# Patient Record
Sex: FEMALE | Race: WHITE | NOT HISPANIC OR LATINO
[De-identification: names, ages, dates, MRNs, and addresses within clinical notes are randomized per-mention and may not be internally consistent; named-entity substitution may affect disease eponyms.]

---

## 2018-11-13 ENCOUNTER — RECORD ABSTRACTING (OUTPATIENT)
Age: 53
End: 2018-11-13

## 2018-11-13 DIAGNOSIS — Z82.69 FAMILY HISTORY OF OTHER DISEASES OF THE MUSCULOSKELETAL SYSTEM AND CONNECTIVE TISSUE: ICD-10-CM

## 2018-11-13 DIAGNOSIS — Z86.19 PERSONAL HISTORY OF OTHER INFECTIOUS AND PARASITIC DISEASES: ICD-10-CM

## 2018-11-13 DIAGNOSIS — Z83.3 FAMILY HISTORY OF DIABETES MELLITUS: ICD-10-CM

## 2018-11-13 DIAGNOSIS — Z82.49 FAMILY HISTORY OF ISCHEMIC HEART DISEASE AND OTHER DISEASES OF THE CIRCULATORY SYSTEM: ICD-10-CM

## 2018-11-13 DIAGNOSIS — Z86.018 PERSONAL HISTORY OF OTHER BENIGN NEOPLASM: ICD-10-CM

## 2018-11-13 DIAGNOSIS — Z80.1 FAMILY HISTORY OF MALIGNANT NEOPLASM OF TRACHEA, BRONCHUS AND LUNG: ICD-10-CM

## 2018-11-13 DIAGNOSIS — Z85.850 PERSONAL HISTORY OF MALIGNANT NEOPLASM OF THYROID: ICD-10-CM

## 2018-11-13 DIAGNOSIS — Z87.891 PERSONAL HISTORY OF NICOTINE DEPENDENCE: ICD-10-CM

## 2018-11-13 RX ORDER — FLUTICASONE PROPIONATE 50 UG/1
50 SPRAY, METERED NASAL DAILY
Refills: 0 | Status: ACTIVE | COMMUNITY

## 2018-11-13 RX ORDER — DESLORATADINE 5 MG/1
5 TABLET, FILM COATED ORAL DAILY
Refills: 0 | Status: ACTIVE | COMMUNITY

## 2018-12-04 ENCOUNTER — LABORATORY RESULT (OUTPATIENT)
Age: 53
End: 2018-12-04

## 2018-12-11 ENCOUNTER — APPOINTMENT (OUTPATIENT)
Dept: ENDOCRINOLOGY | Facility: CLINIC | Age: 53
End: 2018-12-11
Payer: COMMERCIAL

## 2018-12-11 VITALS — SYSTOLIC BLOOD PRESSURE: 112 MMHG | HEART RATE: 80 BPM | DIASTOLIC BLOOD PRESSURE: 78 MMHG | HEIGHT: 61 IN

## 2018-12-11 PROCEDURE — 99214 OFFICE O/P EST MOD 30 MIN: CPT

## 2018-12-11 RX ORDER — LEVOTHYROXINE SODIUM 112 UG/1
112 TABLET ORAL
Refills: 0 | Status: DISCONTINUED | COMMUNITY
End: 2018-12-11

## 2018-12-11 RX ORDER — SERTRALINE HYDROCHLORIDE 25 MG/1
25 TABLET, FILM COATED ORAL DAILY
Refills: 0 | Status: DISCONTINUED | COMMUNITY
End: 2018-12-11

## 2018-12-13 ENCOUNTER — TRANSCRIPTION ENCOUNTER (OUTPATIENT)
Age: 53
End: 2018-12-13

## 2019-02-05 ENCOUNTER — TRANSCRIPTION ENCOUNTER (OUTPATIENT)
Age: 54
End: 2019-02-05

## 2019-03-11 ENCOUNTER — APPOINTMENT (OUTPATIENT)
Dept: ENDOCRINOLOGY | Facility: CLINIC | Age: 54
End: 2019-03-11
Payer: COMMERCIAL

## 2019-03-11 VITALS
SYSTOLIC BLOOD PRESSURE: 110 MMHG | HEIGHT: 61 IN | HEART RATE: 88 BPM | WEIGHT: 150 LBS | BODY MASS INDEX: 28.32 KG/M2 | DIASTOLIC BLOOD PRESSURE: 70 MMHG

## 2019-03-11 LAB
THYROGLOB AB SERPL-ACNC: <20 IU/ML
THYROPEROXIDASE AB SERPL IA-ACNC: <10 IU/ML
TSH SERPL-ACNC: 0.87 UIU/ML

## 2019-03-11 PROCEDURE — 99214 OFFICE O/P EST MOD 30 MIN: CPT

## 2019-03-11 NOTE — PHYSICAL EXAM
[Alert] : alert [No Acute Distress] : no acute distress [Well Nourished] : well nourished [Well Developed] : well developed [Normal Sclera/Conjunctiva] : normal sclera/conjunctiva [EOMI] : extra ocular movement intact [No Proptosis] : no proptosis [Normal Oropharynx] : the oropharynx was normal [Thyroid Not Enlarged] : the thyroid was not enlarged [No Thyroid Nodules] : there were no palpable thyroid nodules [No Respiratory Distress] : no respiratory distress [No Accessory Muscle Use] : no accessory muscle use [Clear to Auscultation] : lungs were clear to auscultation bilaterally [Normal Rate] : heart rate was normal  [Normal S1, S2] : normal S1 and S2 [Regular Rhythm] : with a regular rhythm [Pedal Pulses Normal] : the pedal pulses are present [No Edema] : there was no peripheral edema [Normal Bowel Sounds] : normal bowel sounds [Not Tender] : non-tender [Soft] : abdomen soft [Not Distended] : not distended [Post Cervical Nodes] : posterior cervical nodes [Anterior Cervical Nodes] : anterior cervical nodes [Axillary Nodes] : axillary nodes [Normal] : normal and non tender [No Spinal Tenderness] : no spinal tenderness [Spine Straight] : spine straight [No Stigmata of Cushings Syndrome] : no stigmata of cushings syndrome [Normal Gait] : normal gait [Normal Strength/Tone] : muscle strength and tone were normal [No Rash] : no rash [Acanthosis Nigricans] : no acanthosis nigricans [Normal Reflexes] : deep tendon reflexes were 2+ and symmetric [No Tremors] : no tremors [Oriented x3] : oriented to person, place, and time [de-identified] : thyroid absent surgically

## 2019-03-11 NOTE — REVIEW OF SYSTEMS
[Recent Weight Gain (___ Lbs)] : recent [unfilled] ~Ulb weight gain [Anxiety] : anxiety [Negative] : Heme/Lymph [de-identified] : + nevi [de-identified] : + hot flashes

## 2019-03-11 NOTE — HISTORY OF PRESENT ILLNESS
[FreeTextEntry1] :  52 yo WW coming for f/u papillary thyroid cancer\par 2/18 As compared to the patient's baseline study dated 10/13/2015, there has been no statistically \par significant change in bone density at the lumbar spine or the left hip with a statistically \par significant interval decrease in bone density noted at the left forearm. \par \par        6/1/18 neck US normal done with Dr Navarro at [place]\par        had total thyroidectomy 6/2009 done by Dr Laine Sethi [facility], retired now\par        labs reviewed\par        her deramthologist felt the thyroid nodule in 2008, an Endo in [place] did FNA without US came back negative, then repeated US guided FNA at [facility] showed papillary thyroid cancer one year later\par        did not need BATRES\par        her sister had also papillary thyroid cancer \par        last labwork reviewed \par        TSH 0.09 mIU/L FT4 1.3 (0.8-1.8) same dose Synthroid WILLIE 112mcg qd decreased from 125mcg qd 9/18\par \par        Hg 10.5 (11.7 and above) TG 0.3, TG ab <1, Vit D 29\par        labs 2010 TSH 0.20, FT4 0.92\par        thyroid uptake and scan 2008 showed 1.4cm left aspect of the isthmus cold area, possible thyroid malignancy\par        3/2008 1.4cm slightly lobulatedhetherogenously hypoechoic solid noduleon the left of the isthmus\par        neck US 5/27/15 9.7mm hyperechoic structure to the right is not thought to be thyroid parenchima, althout that possibility is not entirely excluded\par        7/29/2015 74.8mCi I131\par        postradiation WBS done 8/5/2015 2 small focal areas radioaiodine concentration in the midline anterior neck suspicious for small metastases\par         thyroidectomy 7/6/2009 papillary thyroid carcinoma, classical type, well differentiatled, with psammoma bodies, left lobe, greatest diameter 1.3cm, completelly surrounded, focal capsular invasion, focal blood vessel invasion, extrathyroid extension not identified, surgical margins + tumor , microscopic foci are present in the left lobe and the isthmus, one benign lymph node, fragment of prathyroid \par        labs reviewed \par        10/2015 DEXA scan normal spine and hip, mildy osteopenia forearm\par        iPTH elevated at 90, normal Ca normal Vit D, repeated iPTH 114 on 5/16\par        denies personal h/o kidney stones\par        denies h/o fractures\par        thyrogen stimulated WBS 6/6/2016 normal\par        thyrogen stim thyroglobulin level 6/6/2016 at [place] Tg <0.2 ng/ML, atg <20\par        thyrogen stimulated whole body scan negative 6/14/17\par        thyroglobulin level 6/19/17 0.7 negative ab.

## 2019-06-05 ENCOUNTER — LABORATORY RESULT (OUTPATIENT)
Age: 54
End: 2019-06-05

## 2019-06-11 ENCOUNTER — APPOINTMENT (OUTPATIENT)
Dept: ENDOCRINOLOGY | Facility: CLINIC | Age: 54
End: 2019-06-11
Payer: COMMERCIAL

## 2019-06-11 VITALS
DIASTOLIC BLOOD PRESSURE: 78 MMHG | WEIGHT: 147 LBS | HEART RATE: 84 BPM | BODY MASS INDEX: 27.75 KG/M2 | SYSTOLIC BLOOD PRESSURE: 126 MMHG | HEIGHT: 61 IN

## 2019-06-11 PROCEDURE — 99215 OFFICE O/P EST HI 40 MIN: CPT

## 2019-06-11 RX ORDER — SERTRALINE HYDROCHLORIDE 50 MG/1
50 TABLET, FILM COATED ORAL DAILY
Refills: 0 | Status: ACTIVE | COMMUNITY

## 2019-06-11 NOTE — PHYSICAL EXAM
[Alert] : alert [No Acute Distress] : no acute distress [Well Developed] : well developed [Well Nourished] : well nourished [EOMI] : extra ocular movement intact [Normal Sclera/Conjunctiva] : normal sclera/conjunctiva [No Proptosis] : no proptosis [Normal Oropharynx] : the oropharynx was normal [Thyroid Not Enlarged] : the thyroid was not enlarged [No Respiratory Distress] : no respiratory distress [No Thyroid Nodules] : there were no palpable thyroid nodules [No Accessory Muscle Use] : no accessory muscle use [Clear to Auscultation] : lungs were clear to auscultation bilaterally [Normal S1, S2] : normal S1 and S2 [Normal Rate] : heart rate was normal  [Pedal Pulses Normal] : the pedal pulses are present [Regular Rhythm] : with a regular rhythm [No Edema] : there was no peripheral edema [Normal Bowel Sounds] : normal bowel sounds [Soft] : abdomen soft [Not Tender] : non-tender [Post Cervical Nodes] : posterior cervical nodes [Not Distended] : not distended [Anterior Cervical Nodes] : anterior cervical nodes [Axillary Nodes] : axillary nodes [Normal] : normal and non tender [No Stigmata of Cushings Syndrome] : no stigmata of cushings syndrome [No Spinal Tenderness] : no spinal tenderness [Spine Straight] : spine straight [Normal Gait] : normal gait [Normal Strength/Tone] : muscle strength and tone were normal [No Rash] : no rash [Acanthosis Nigricans] : no acanthosis nigricans [Normal Reflexes] : deep tendon reflexes were 2+ and symmetric [No Tremors] : no tremors [Oriented x3] : oriented to person, place, and time

## 2019-06-11 NOTE — REVIEW OF SYSTEMS
[Anxiety] : anxiety [Recent Weight Gain (___ Lbs)] : recent [unfilled] ~Ulb weight gain [Negative] : Endocrine [de-identified] : + hot flashes [de-identified] : + nevi

## 2019-06-11 NOTE — HISTORY OF PRESENT ILLNESS
[FreeTextEntry1] :  52 yo WW coming for f/u papillary thyroid cancer\par 2/18 As compared to the patient's baseline study dated 10/13/2015, there has been no statistically \par significant change in bone density at the lumbar spine or the left hip with a statistically \par significant interval decrease in bone density noted at the left forearm. \par \par        6/1/18 neck US normal done with Dr Navarro at [place]\par        had total thyroidectomy 6/2009 done by Dr Laine Sethi [facility], retired now\par        labs reviewed\par        her deramthologist felt the thyroid nodule in 2008, an Endo in [place] did FNA without US came back negative, then repeated US guided FNA at [facility] showed papillary thyroid cancer one year later\par        did not need BATRES\par        her sister had also papillary thyroid cancer \par        last labwork reviewed \par        TSH 0.09 mIU/L FT4 1.3 (0.8-1.8) same dose Synthroid WILLIE 112mcg qd decreased from 125mcg qd 9/18\par \par        Hg 10.5 (11.7 and above) TG 0.3, TG ab <1, Vit D 29\par        labs 2010 TSH 0.20, FT4 0.92\par        thyroid uptake and scan 2008 showed 1.4cm left aspect of the isthmus cold area, possible thyroid malignancy\par        3/2008 1.4cm slightly lobulatedhetherogenously hypoechoic solid noduleon the left of the isthmus\par        neck US 5/27/15 9.7mm hyperechoic structure to the right is not thought to be thyroid parenchima, althout that possibility is not entirely excluded\par        7/29/2015 74.8mCi I131\par        postradiation WBS done 8/5/2015 2 small focal areas radioaiodine concentration in the midline anterior neck suspicious for small metastases\par         thyroidectomy 7/6/2009 papillary thyroid carcinoma, classical type, well differentiatled, with psammoma bodies, left lobe, greatest diameter 1.3cm, completelly surrounded, focal capsular invasion, focal blood vessel invasion, extrathyroid extension not identified, surgical margins + tumor , microscopic foci are present in the left lobe and the isthmus, one benign lymph node, fragment of prathyroid \par        labs reviewed \par        10/2015 DEXA scan normal spine and hip, mildy osteopenia forearm\par        iPTH elevated at 90, normal Ca normal Vit D, repeated iPTH 114 on 5/16\par        denies personal h/o kidney stones\par        denies h/o fractures\par        thyrogen stimulated WBS 6/6/2016 normal\par        thyrogen stim thyroglobulin level 6/6/2016 at [place] Tg <0.2 ng/ML, atg <20\par        thyrogen stimulated whole body scan negative 6/14/17\par        thyroglobulin level 6/19/17 0.7 negative ab.

## 2019-06-20 ENCOUNTER — RX RENEWAL (OUTPATIENT)
Age: 54
End: 2019-06-20

## 2019-09-09 ENCOUNTER — LABORATORY RESULT (OUTPATIENT)
Age: 54
End: 2019-09-09

## 2019-09-16 ENCOUNTER — APPOINTMENT (OUTPATIENT)
Dept: ENDOCRINOLOGY | Facility: CLINIC | Age: 54
End: 2019-09-16
Payer: COMMERCIAL

## 2019-09-16 VITALS
BODY MASS INDEX: 27.94 KG/M2 | HEIGHT: 61 IN | SYSTOLIC BLOOD PRESSURE: 120 MMHG | HEART RATE: 106 BPM | OXYGEN SATURATION: 96 % | DIASTOLIC BLOOD PRESSURE: 68 MMHG | RESPIRATION RATE: 16 BRPM | WEIGHT: 148 LBS

## 2019-09-16 PROCEDURE — 99214 OFFICE O/P EST MOD 30 MIN: CPT

## 2019-09-16 NOTE — HISTORY OF PRESENT ILLNESS
[FreeTextEntry1] :  52 yo WW coming for f/u papillary thyroid cancer\par 2/18 As compared to the patient's baseline study dated 10/13/2015, there has been no statistically \par significant change in bone density at the lumbar spine or the left hip with a statistically \par significant interval decrease in bone density noted at the left forearm. \par \par        6/1/18 neck US normal done with Dr Navarro at [place]\par        had total thyroidectomy 6/2009 done by Dr Laine Sethi [facility], retired now\par        labs reviewed\par        her deramthologist felt the thyroid nodule in 2008, an Endo in [place] did FNA without US came back negative, then repeated US guided FNA at [facility] showed papillary thyroid cancer one year later\par        did not need BATRES\par        her sister had also papillary thyroid cancer \par        last labwork reviewed \par        TSH 0.09 mIU/L FT4 1.3 (0.8-1.8) same dose Synthroid WILLIE 112mcg qd decreased from 125mcg qd 9/18\par \par        Hg 10.5 (11.7 and above) TG 0.3, TG ab <1, Vit D 29\par        labs 2010 TSH 0.20, FT4 0.92\par        thyroid uptake and scan 2008 showed 1.4cm left aspect of the isthmus cold area, possible thyroid malignancy\par        3/2008 1.4cm slightly lobulatedhetherogenously hypoechoic solid noduleon the left of the isthmus\par        neck US 5/27/15 9.7mm hyperechoic structure to the right is not thought to be thyroid parenchima, althout that possibility is not entirely excluded\par        7/29/2015 74.8mCi I131\par        postradiation WBS done 8/5/2015 2 small focal areas radioaiodine concentration in the midline anterior neck suspicious for small metastases\par         thyroidectomy 7/6/2009 papillary thyroid carcinoma, classical type, well differentiatled, with psammoma bodies, left lobe, greatest diameter 1.3cm, completelly surrounded, focal capsular invasion, focal blood vessel invasion, extrathyroid extension not identified, surgical margins + tumor , microscopic foci are present in the left lobe and the isthmus, one benign lymph node, fragment of prathyroid \par        labs reviewed \par        10/2015 DEXA scan normal spine and hip, mildly osteopenia forearm\par        iPTH elevated at 90, normal Ca normal Vit D, repeated iPTH 114 on 5/16\par        denies personal h/o kidney stones\par        denies h/o fractures\par        thyrogen stimulated WBS 6/6/2016 normal\par        thyrogen stim thyroglobulin level 6/6/2016 at [place] Tg <0.2 ng/ML, atg <20\par        thyrogen stimulated whole body scan negative 6/14/17\par        thyroglobulin level 6/19/17 0.7 negative ab.

## 2019-12-02 ENCOUNTER — LABORATORY RESULT (OUTPATIENT)
Age: 54
End: 2019-12-02

## 2019-12-09 ENCOUNTER — APPOINTMENT (OUTPATIENT)
Dept: ENDOCRINOLOGY | Facility: CLINIC | Age: 54
End: 2019-12-09
Payer: COMMERCIAL

## 2019-12-09 VITALS
OXYGEN SATURATION: 98 % | WEIGHT: 148 LBS | HEART RATE: 86 BPM | DIASTOLIC BLOOD PRESSURE: 70 MMHG | BODY MASS INDEX: 27.94 KG/M2 | SYSTOLIC BLOOD PRESSURE: 120 MMHG | HEIGHT: 61 IN

## 2019-12-09 PROCEDURE — 99214 OFFICE O/P EST MOD 30 MIN: CPT

## 2019-12-27 NOTE — HISTORY OF PRESENT ILLNESS
[FreeTextEntry1] :  52 yo WW coming for f/u papillary thyroid cancer\par 2/18 As compared to the patient's baseline study dated 10/13/2015, there has been no statistically \par significant change in bone density at the lumbar spine or the left hip with a statistically \par significant interval decrease in bone density noted at the left forearm. \par \par        6/1/18 neck US normal done with Dr Navarro at [place]\par        had total thyroidectomy 6/2009 done by Dr Laine Sethi [facility], retired now\par        labs reviewed\par        her deramthologist felt the thyroid nodule in 2008, an Endo in [place] did FNA without US came back negative, then repeated US guided FNA at [facility] showed papillary thyroid cancer one year later\par        did not need BATRES\par        her sister had also papillary thyroid cancer \par        last labwork reviewed \par        TSH 0.09 mIU/L FT4 1.3 (0.8-1.8) same dose Synthroid WILLIE 112mcg qd decreased from 125mcg qd 9/18\par \par        Hg 10.5 (11.7 and above) TG 0.3, TG ab <1, Vit D 29\par        labs 2010 TSH 0.20, FT4 0.92\par        thyroid uptake and scan 2008 showed 1.4cm left aspect of the isthmus cold area, possible thyroid malignancy\par        3/2008 1.4cm slightly lobulatedhetherogenously hypoechoic solid noduleon the left of the isthmus\par        neck US 5/27/15 9.7mm hyperechoic structure to the right is not thought to be thyroid parenchima, althout that possibility is not entirely excluded\par        7/29/2015 74.8mCi I131\par        postradiation WBS done 8/5/2015 2 small focal areas radioaiodine concentration in the midline anterior neck suspicious for small metastases\par         thyroidectomy 7/6/2009 papillary thyroid carcinoma, classical type, well differentiatled, with psammoma bodies, left lobe, greatest diameter 1.3cm, completelly surrounded, focal capsular invasion, focal blood vessel invasion, extrathyroid extension not identified, surgical margins + tumor , microscopic foci are present in the left lobe and the isthmus, one benign lymph node, fragment of prathyroid \par        labs reviewed \par        10/2015 DEXA scan normal spine and hip, mildly osteopenia forearm\par        iPTH elevated at 90, normal Ca normal Vit D, repeated iPTH 114 on 5/16\par        denies personal h/o kidney stones\par        denies h/o fractures\par        thyrogen stimulated WBS 6/6/2016 normal\par        thyrogen stim thyroglobulin level 6/6/2016 at [place] Tg <0.2 ng/ML, atg <20\par        thyrogen stimulated whole body scan negative 6/14/17\par        thyroglobulin level 6/19/17 0.7 negative ab.

## 2019-12-27 NOTE — REVIEW OF SYSTEMS
[Recent Weight Gain (___ Lbs)] : recent [unfilled] ~Ulb weight gain [Anxiety] : anxiety [Negative] : Heme/Lymph [de-identified] : + hot flashes [de-identified] : + nevi

## 2019-12-27 NOTE — PHYSICAL EXAM
[Alert] : alert [No Acute Distress] : no acute distress [Well Nourished] : well nourished [Normal Sclera/Conjunctiva] : normal sclera/conjunctiva [Well Developed] : well developed [EOMI] : extra ocular movement intact [No Proptosis] : no proptosis [No Thyroid Nodules] : there were no palpable thyroid nodules [Normal Oropharynx] : the oropharynx was normal [Thyroid Not Enlarged] : the thyroid was not enlarged [No Accessory Muscle Use] : no accessory muscle use [No Respiratory Distress] : no respiratory distress [Clear to Auscultation] : lungs were clear to auscultation bilaterally [Normal S1, S2] : normal S1 and S2 [Normal Rate] : heart rate was normal  [Pedal Pulses Normal] : the pedal pulses are present [Regular Rhythm] : with a regular rhythm [Normal Bowel Sounds] : normal bowel sounds [No Edema] : there was no peripheral edema [Not Tender] : non-tender [Not Distended] : not distended [Soft] : abdomen soft [Post Cervical Nodes] : posterior cervical nodes [Axillary Nodes] : axillary nodes [Anterior Cervical Nodes] : anterior cervical nodes [No Spinal Tenderness] : no spinal tenderness [Normal] : normal and non tender [No Stigmata of Cushings Syndrome] : no stigmata of cushings syndrome [Spine Straight] : spine straight [Normal Gait] : normal gait [Normal Strength/Tone] : muscle strength and tone were normal [No Rash] : no rash [No Tremors] : no tremors [Normal Reflexes] : deep tendon reflexes were 2+ and symmetric [Acanthosis Nigricans] : no acanthosis nigricans [Oriented x3] : oriented to person, place, and time

## 2020-03-31 ENCOUNTER — APPOINTMENT (OUTPATIENT)
Dept: ENDOCRINOLOGY | Facility: CLINIC | Age: 55
End: 2020-03-31
Payer: COMMERCIAL

## 2020-03-31 PROCEDURE — 99442: CPT

## 2020-07-21 ENCOUNTER — TRANSCRIPTION ENCOUNTER (OUTPATIENT)
Age: 55
End: 2020-07-21

## 2020-07-21 ENCOUNTER — APPOINTMENT (OUTPATIENT)
Dept: ENDOCRINOLOGY | Facility: CLINIC | Age: 55
End: 2020-07-21
Payer: COMMERCIAL

## 2020-07-21 PROCEDURE — 99214 OFFICE O/P EST MOD 30 MIN: CPT | Mod: 95

## 2020-07-21 NOTE — HISTORY OF PRESENT ILLNESS
[Home] : at home, [unfilled] , at the time of the visit. [Medical Office: ([facility])___] : at the medical office located in  [Verbal consent obtained from patient] : the patient, [unfilled] [FreeTextEntry1] :  55 yo WW coming for f/u papillary thyroid cancer\par 2/18 As compared to the patient's baseline study dated 10/13/2015, there has been no statistically \par significant change in bone density at the lumbar spine or the left hip with a statistically \par significant interval decrease in bone density noted at the left forearm. \par \par        6/1/18 neck US normal done with Dr Navarro at [place]\par        had total thyroidectomy 6/2009 done by Dr Laine Sethi [facility], retired now\par        labs reviewed\par        her deramthologist felt the thyroid nodule in 2008, an Endo in [place] did FNA without US came back negative, then repeated US guided FNA at [facility] showed papillary thyroid cancer one year later\par        did not need BATRES\par        her sister had also papillary thyroid cancer \par        last labwork reviewed \par        TSH 0.09 mIU/L FT4 1.3 (0.8-1.8) same dose Synthroid WILLIE 112mcg qd decreased from 125mcg qd 9/18\par \par        Hg 10.5 (11.7 and above) TG 0.3, TG ab <1, Vit D 29\par        labs 2010 TSH 0.20, FT4 0.92\par        thyroid uptake and scan 2008 showed 1.4cm left aspect of the isthmus cold area, possible thyroid malignancy\par        3/2008 1.4cm slightly lobulatedhetherogenously hypoechoic solid noduleon the left of the isthmus\par        neck US 5/27/15 9.7mm hyperechoic structure to the right is not thought to be thyroid parenchima, althout that possibility is not entirely excluded\par        7/29/2015 74.8mCi I131\par        postradiation WBS done 8/5/2015 2 small focal areas radioaiodine concentration in the midline anterior neck suspicious for small metastases\par         thyroidectomy 7/6/2009 papillary thyroid carcinoma, classical type, well differentiatled, with psammoma bodies, left lobe, greatest diameter 1.3cm, completelly surrounded, focal capsular invasion, focal blood vessel invasion, extrathyroid extension not identified, surgical margins + tumor , microscopic foci are present in the left lobe and the isthmus, one benign lymph node, fragment of prathyroid \par        labs reviewed \par        10/2015 DEXA scan normal spine and hip, mildly osteopenia forearm\par        iPTH elevated at 90, normal Ca normal Vit D, repeated iPTH 114 on 5/16\par        denies personal h/o kidney stones\par        denies h/o fractures\par        thyrogen stimulated WBS 6/6/2016 normal\par        thyrogen stim thyroglobulin level 6/6/2016 at [place] Tg <0.2 ng/ML, atg <20\par        thyrogen stimulated whole body scan negative 6/14/17\par        thyroglobulin level 6/19/17 0.7 negative ab.

## 2020-09-24 ENCOUNTER — RESULT REVIEW (OUTPATIENT)
Age: 55
End: 2020-09-24

## 2020-09-30 LAB
24R-OH-CALCIDIOL SERPL-MCNC: 77.6 PG/ML
25(OH)D3 SERPL-MCNC: 59.3 NG/ML
ALBUMIN SERPL ELPH-MCNC: 4.5 G/DL
ALP BLD-CCNC: 92 U/L
ALT SERPL-CCNC: 23 U/L
ANION GAP SERPL CALC-SCNC: 14 MMOL/L
AST SERPL-CCNC: 25 U/L
BILIRUB SERPL-MCNC: 0.2 MG/DL
BUN SERPL-MCNC: 19 MG/DL
CALCIUM SERPL-MCNC: 9.2 MG/DL
CALCIUM SERPL-MCNC: 9.2 MG/DL
CHLORIDE SERPL-SCNC: 102 MMOL/L
CHOLEST SERPL-MCNC: 175 MG/DL
CHOLEST/HDLC SERPL: 3.2 RATIO
CO2 SERPL-SCNC: 25 MMOL/L
CREAT SERPL-MCNC: 0.87 MG/DL
ESTIMATED AVERAGE GLUCOSE: 134 MG/DL
GLUCOSE SERPL-MCNC: 106 MG/DL
HBA1C MFR BLD HPLC: 6.3 %
HDLC SERPL-MCNC: 55 MG/DL
LDLC SERPL CALC-MCNC: 56 MG/DL
PARATHYROID HORMONE INTACT: 69 PG/ML
POTASSIUM SERPL-SCNC: 4.3 MMOL/L
PROT SERPL-MCNC: 6.7 G/DL
SODIUM SERPL-SCNC: 140 MMOL/L
T4 FREE SERPL-MCNC: 1.2 NG/DL
THYROGLOB AB SERPL-ACNC: <20 IU/ML
THYROGLOB SERPL-MCNC: <0.2 NG/ML
TRIGL SERPL-MCNC: 320 MG/DL
TSH SERPL-ACNC: 0.42 UIU/ML

## 2020-10-01 ENCOUNTER — APPOINTMENT (OUTPATIENT)
Dept: ENDOCRINOLOGY | Facility: CLINIC | Age: 55
End: 2020-10-01
Payer: COMMERCIAL

## 2020-10-01 PROCEDURE — 99214 OFFICE O/P EST MOD 30 MIN: CPT | Mod: 95

## 2020-10-01 NOTE — REASON FOR VISIT
[Follow - Up] : a follow-up visit [Hypothyroidism] : hypothyroidism [Osteoporosis] : osteoporosis [Thyroid Cancer] : thyroid cancer

## 2020-10-01 NOTE — HISTORY OF PRESENT ILLNESS
[Home] : at home, [unfilled] , at the time of the visit. [Medical Office: ([facility])___] : at the medical office located in  [Verbal consent obtained from patient] : the patient, [unfilled] [FreeTextEntry1] :  54 yo WW coming for f/u papillary thyroid cancer\par 2/18 As compared to the patient's baseline study dated 10/13/2015, there has been no statistically \par significant change in bone density at the lumbar spine or the left hip with a statistically \par significant interval decrease in bone density noted at the left forearm. \par \par        6/1/18 neck US normal done with Dr Navarro at [place]\par        had total thyroidectomy 6/2009 done by Dr Laine Sethi [facility], retired now\par        labs reviewed\par        her deramthologist felt the thyroid nodule in 2008, an Endo in [place] did FNA without US came back negative, then repeated US guided FNA at [facility] showed papillary thyroid cancer one year later\par        did not need BATRES\par        her sister had also papillary thyroid cancer \par        last labwork reviewed \par        TSH 0.09 mIU/L FT4 1.3 (0.8-1.8) same dose Synthroid WILLIE 112mcg qd decreased from 125mcg qd 9/18\par \par        Hg 10.5 (11.7 and above) TG 0.3, TG ab <1, Vit D 29\par        labs 2010 TSH 0.20, FT4 0.92\par        thyroid uptake and scan 2008 showed 1.4cm left aspect of the isthmus cold area, possible thyroid malignancy\par        3/2008 1.4cm slightly lobulatedhetherogenously hypoechoic solid noduleon the left of the isthmus\par        neck US 5/27/15 9.7mm hyperechoic structure to the right is not thought to be thyroid parenchima, althout that possibility is not entirely excluded\par        7/29/2015 74.8mCi I131\par        postradiation WBS done 8/5/2015 2 small focal areas radioaiodine concentration in the midline anterior neck suspicious for small metastases\par         thyroidectomy 7/6/2009 papillary thyroid carcinoma, classical type, well differentiatled, with psammoma bodies, left lobe, greatest diameter 1.3cm, completelly surrounded, focal capsular invasion, focal blood vessel invasion, extrathyroid extension not identified, surgical margins + tumor , microscopic foci are present in the left lobe and the isthmus, one benign lymph node, fragment of prathyroid \par        labs reviewed \par        10/2015 DEXA scan normal spine and hip, mildly osteopenia forearm\par        iPTH elevated at 90, normal Ca normal Vit D, repeated iPTH 114 on 5/16\par        denies personal h/o kidney stones\par        denies h/o fractures\par        thyrogen stimulated WBS 6/6/2016 normal\par        thyrogen stim thyroglobulin level 6/6/2016 at [place] Tg <0.2 ng/ML, atg <20\par        thyrogen stimulated whole body scan negative 6/14/17\par        thyroglobulin level 6/19/17 0.7 negative ab.

## 2020-10-21 ENCOUNTER — RX RENEWAL (OUTPATIENT)
Age: 55
End: 2020-10-21

## 2020-12-22 ENCOUNTER — TRANSCRIPTION ENCOUNTER (OUTPATIENT)
Age: 55
End: 2020-12-22

## 2021-01-21 ENCOUNTER — APPOINTMENT (OUTPATIENT)
Dept: ENDOCRINOLOGY | Facility: CLINIC | Age: 56
End: 2021-01-21
Payer: COMMERCIAL

## 2021-01-21 LAB
24R-OH-CALCIDIOL SERPL-MCNC: 85.9 PG/ML
25(OH)D3 SERPL-MCNC: 66.4 NG/ML
ALBUMIN SERPL ELPH-MCNC: 4.7 G/DL
ALP BLD-CCNC: 82 U/L
ALT SERPL-CCNC: 23 U/L
ANION GAP SERPL CALC-SCNC: 12 MMOL/L
AST SERPL-CCNC: 21 U/L
BILIRUB SERPL-MCNC: 0.3 MG/DL
BUN SERPL-MCNC: 16 MG/DL
CA-I SERPL-SCNC: 1.19 MMOL/L
CALCIUM SERPL-MCNC: 9.5 MG/DL
CALCIUM SERPL-MCNC: 9.5 MG/DL
CHLORIDE SERPL-SCNC: 102 MMOL/L
CHOLEST SERPL-MCNC: 154 MG/DL
CO2 SERPL-SCNC: 26 MMOL/L
CREAT SERPL-MCNC: 0.95 MG/DL
ESTIMATED AVERAGE GLUCOSE: 126 MG/DL
GLUCOSE SERPL-MCNC: 85 MG/DL
HBA1C MFR BLD HPLC: 6 %
HDLC SERPL-MCNC: 52 MG/DL
LDLC SERPL CALC-MCNC: 49 MG/DL
MAGNESIUM SERPL-MCNC: 2.1 MG/DL
NONHDLC SERPL-MCNC: 102 MG/DL
PARATHYROID HORMONE INTACT: 46 PG/ML
PHOSPHATE SERPL-MCNC: 4.7 MG/DL
POTASSIUM SERPL-SCNC: 4.6 MMOL/L
PROT SERPL-MCNC: 6.6 G/DL
SODIUM SERPL-SCNC: 140 MMOL/L
T4 FREE SERPL-MCNC: 1.6 NG/DL
TRIGL SERPL-MCNC: 263 MG/DL
TSH SERPL-ACNC: 0.16 UIU/ML

## 2021-01-21 PROCEDURE — 99215 OFFICE O/P EST HI 40 MIN: CPT | Mod: 95

## 2021-01-21 NOTE — HISTORY OF PRESENT ILLNESS
[FreeTextEntry1] :  56 yo WW coming for f/u papillary thyroid cancer\par 2/18 As compared to the patient's baseline study dated 10/13/2015, there has been no statistically \par significant change in bone density at the lumbar spine or the left hip with a statistically \par significant interval decrease in bone density noted at the left forearm. \par \par        6/1/18 neck US normal done with Dr Navarro at [place]\par        had total thyroidectomy 6/2009 done by Dr Laine Sethi [facility], retired now\par        labs reviewed\par        her deramthologist felt the thyroid nodule in 2008, an Endo in [place] did FNA without US came back negative, then repeated US guided FNA at [facility] showed papillary thyroid cancer one year later\par        did not need BATRES\par        her sister had also papillary thyroid cancer \par        last labwork reviewed \par        TSH 0.09 mIU/L FT4 1.3 (0.8-1.8) same dose Synthroid WILLIE 112mcg qd decreased from 125mcg qd 9/18\par \par        Hg 10.5 (11.7 and above) TG 0.3, TG ab <1, Vit D 29\par        labs 2010 TSH 0.20, FT4 0.92\par        thyroid uptake and scan 2008 showed 1.4cm left aspect of the isthmus cold area, possible thyroid malignancy\par        3/2008 1.4cm slightly lobulatedhetherogenously hypoechoic solid noduleon the left of the isthmus\par        neck US 5/27/15 9.7mm hyperechoic structure to the right is not thought to be thyroid parenchima, althout that possibility is not entirely excluded\par        7/29/2015 74.8mCi I131\par        postradiation WBS done 8/5/2015 2 small focal areas radioaiodine concentration in the midline anterior neck suspicious for small metastases\par         thyroidectomy 7/6/2009 papillary thyroid carcinoma, classical type, well differentiatled, with psammoma bodies, left lobe, greatest diameter 1.3cm, completelly surrounded, focal capsular invasion, focal blood vessel invasion, extrathyroid extension not identified, surgical margins + tumor , microscopic foci are present in the left lobe and the isthmus, one benign lymph node, fragment of prathyroid \par        labs reviewed \par        10/2015 DEXA scan normal spine and hip, mildly osteopenia forearm\par        iPTH elevated at 90, normal Ca normal Vit D, repeated iPTH 114 on 5/16\par        denies personal h/o kidney stones\par        denies h/o fractures\par        thyrogen stimulated WBS 6/6/2016 normal\par        thyrogen stim thyroglobulin level 6/6/2016 at [place] Tg <0.2 ng/ML, atg <20\par        thyrogen stimulated whole body scan negative 6/14/17\par        thyroglobulin level 6/19/17 0.7 negative ab.

## 2021-03-23 ENCOUNTER — RX RENEWAL (OUTPATIENT)
Age: 56
End: 2021-03-23

## 2021-05-26 ENCOUNTER — APPOINTMENT (OUTPATIENT)
Dept: ENDOCRINOLOGY | Facility: CLINIC | Age: 56
End: 2021-05-26
Payer: COMMERCIAL

## 2021-05-26 VITALS
SYSTOLIC BLOOD PRESSURE: 100 MMHG | HEIGHT: 61 IN | BODY MASS INDEX: 25.49 KG/M2 | WEIGHT: 135 LBS | DIASTOLIC BLOOD PRESSURE: 68 MMHG

## 2021-05-26 LAB
24R-OH-CALCIDIOL SERPL-MCNC: 58.7 PG/ML
25(OH)D3 SERPL-MCNC: 70.7 NG/ML
ALBUMIN SERPL ELPH-MCNC: 4.8 G/DL
ALP BLD-CCNC: 71 U/L
ALT SERPL-CCNC: 17 U/L
ANION GAP SERPL CALC-SCNC: 13 MMOL/L
AST SERPL-CCNC: 21 U/L
BILIRUB SERPL-MCNC: 0.2 MG/DL
BUN SERPL-MCNC: 16 MG/DL
CALCIUM SERPL-MCNC: 9.5 MG/DL
CHLORIDE SERPL-SCNC: 102 MMOL/L
CHOLEST SERPL-MCNC: 162 MG/DL
CO2 SERPL-SCNC: 25 MMOL/L
CREAT SERPL-MCNC: 0.78 MG/DL
ESTIMATED AVERAGE GLUCOSE: 126 MG/DL
GLUCOSE SERPL-MCNC: 98 MG/DL
HBA1C MFR BLD HPLC: 6 %
HDLC SERPL-MCNC: 51 MG/DL
LDLC SERPL CALC-MCNC: 39 MG/DL
MAGNESIUM SERPL-MCNC: 2.1 MG/DL
NONHDLC SERPL-MCNC: 111 MG/DL
POTASSIUM SERPL-SCNC: 4.2 MMOL/L
PROT SERPL-MCNC: 6.9 G/DL
SODIUM SERPL-SCNC: 140 MMOL/L
THYROGLOB AB SERPL-ACNC: <20 IU/ML
THYROGLOB SERPL-MCNC: <0.2 NG/ML
TRIGL SERPL-MCNC: 359 MG/DL

## 2021-05-26 PROCEDURE — 99215 OFFICE O/P EST HI 40 MIN: CPT | Mod: 95

## 2021-05-27 LAB
CALCIUM SERPL-MCNC: 9.5 MG/DL
PARATHYROID HORMONE INTACT: 50 PG/ML
PHOSPHATE SERPL-MCNC: 4.2 MG/DL

## 2021-05-27 RX ORDER — FENOFIBRATE 145 MG/1
145 TABLET, COATED ORAL
Qty: 90 | Refills: 1 | Status: DISCONTINUED | COMMUNITY
Start: 2021-05-26 | End: 2021-05-27

## 2021-05-27 NOTE — HISTORY OF PRESENT ILLNESS
[Home] : at home, [unfilled] , at the time of the visit. [Medical Office: ([facility])___] : at the medical office located in  [Verbal consent obtained from patient] : the patient, [unfilled] [FreeTextEntry1] :  54 yo WW coming for f/u papillary thyroid cancer\par 2/18 As compared to the patient's baseline study dated 10/13/2015, there has been no statistically \par significant change in bone density at the lumbar spine or the left hip with a statistically \par significant interval decrease in bone density noted at the left forearm. \par \par        6/1/18 neck US normal done with Dr Navarro at [place]\par        had total thyroidectomy 6/2009 done by Dr Laine Sethi [facility], retired now\par        labs reviewed\par        her deramthologist felt the thyroid nodule in 2008, an Endo in [place] did FNA without US came back negative, then repeated US guided FNA at [facility] showed papillary thyroid cancer one year later\par        did not need BATRES\par        her sister had also papillary thyroid cancer \par        last labwork reviewed \par        TSH 0.09 mIU/L FT4 1.3 (0.8-1.8) same dose Synthroid WILLIE 112mcg qd decreased from 125mcg qd 9/18\par \par        Hg 10.5 (11.7 and above) TG 0.3, TG ab <1, Vit D 29\par        labs 2010 TSH 0.20, FT4 0.92\par        thyroid uptake and scan 2008 showed 1.4cm left aspect of the isthmus cold area, possible thyroid malignancy\par        3/2008 1.4cm slightly lobulatedhetherogenously hypoechoic solid noduleon the left of the isthmus\par        neck US 5/27/15 9.7mm hyperechoic structure to the right is not thought to be thyroid parenchima, althout that possibility is not entirely excluded\par        7/29/2015 74.8mCi I131\par        postradiation WBS done 8/5/2015 2 small focal areas radioaiodine concentration in the midline anterior neck suspicious for small metastases\par         thyroidectomy 7/6/2009 papillary thyroid carcinoma, classical type, well differentiatled, with psammoma bodies, left lobe, greatest diameter 1.3cm, completelly surrounded, focal capsular invasion, focal blood vessel invasion, extrathyroid extension not identified, surgical margins + tumor , microscopic foci are present in the left lobe and the isthmus, one benign lymph node, fragment of prathyroid \par        labs reviewed \par        10/2015 DEXA scan normal spine and hip, mildly osteopenia forearm\par        iPTH elevated at 90, normal Ca normal Vit D, repeated iPTH 114 on 5/16\par        denies personal h/o kidney stones\par        denies h/o fractures\par        thyrogen stimulated WBS 6/6/2016 normal\par        thyrogen stim thyroglobulin level 6/6/2016 at [place] Tg <0.2 ng/ML, atg <20\par        thyrogen stimulated whole body scan negative 6/14/17\par        thyroglobulin level 6/19/17 0.7 negative ab.

## 2021-09-21 ENCOUNTER — APPOINTMENT (OUTPATIENT)
Dept: ENDOCRINOLOGY | Facility: CLINIC | Age: 56
End: 2021-09-21
Payer: COMMERCIAL

## 2021-09-21 VITALS
BODY MASS INDEX: 24.92 KG/M2 | TEMPERATURE: 96.3 F | WEIGHT: 132 LBS | OXYGEN SATURATION: 98 % | DIASTOLIC BLOOD PRESSURE: 70 MMHG | HEIGHT: 61 IN | HEART RATE: 89 BPM | SYSTOLIC BLOOD PRESSURE: 100 MMHG | RESPIRATION RATE: 18 BRPM

## 2021-09-21 DIAGNOSIS — Z85.850 PERSONAL HISTORY OF MALIGNANT NEOPLASM OF THYROID: ICD-10-CM

## 2021-09-21 PROCEDURE — 99215 OFFICE O/P EST HI 40 MIN: CPT

## 2021-09-24 PROBLEM — Z85.850 HISTORY OF MALIGNANT NEOPLASM OF THYROID: Status: RESOLVED | Noted: 2018-11-13 | Resolved: 2021-09-24

## 2021-09-24 NOTE — HISTORY OF PRESENT ILLNESS
[FreeTextEntry1] :  55 yo WW coming for f/u papillary thyroid cancer\par 2/18 As compared to the patient's baseline study dated 10/13/2015, there has been no statistically \par significant change in bone density at the lumbar spine or the left hip with a statistically \par significant interval decrease in bone density noted at the left forearm. \par \par        6/1/18 neck US normal done with Dr Navarro at [place]\par        had total thyroidectomy 6/2009 done by Dr Laine Sethi [facility], retired now\par        labs reviewed\par        her deramthologist felt the thyroid nodule in 2008, an Endo in [place] did FNA without US came back negative, then repeated US guided FNA at [facility] showed papillary thyroid cancer one year later\par        did not need BATRES\par        her sister had also papillary thyroid cancer \par        last labwork reviewed \par        TSH 0.09 mIU/L FT4 1.3 (0.8-1.8) same dose Synthroid WILLIE 112mcg qd decreased from 125mcg qd 9/18\par \par        Hg 10.5 (11.7 and above) TG 0.3, TG ab <1, Vit D 29\par        labs 2010 TSH 0.20, FT4 0.92\par        thyroid uptake and scan 2008 showed 1.4cm left aspect of the isthmus cold area, possible thyroid malignancy\par        3/2008 1.4cm slightly lobulatedhetherogenously hypoechoic solid noduleon the left of the isthmus\par        neck US 5/27/15 9.7mm hyperechoic structure to the right is not thought to be thyroid parenchima, althout that possibility is not entirely excluded\par        7/29/2015 74.8mCi I131\par        postradiation WBS done 8/5/2015 2 small focal areas radioaiodine concentration in the midline anterior neck suspicious for small metastases\par         thyroidectomy 7/6/2009 papillary thyroid carcinoma, classical type, well differentiatled, with psammoma bodies, left lobe, greatest diameter 1.3cm, completelly surrounded, focal capsular invasion, focal blood vessel invasion, extrathyroid extension not identified, surgical margins + tumor , microscopic foci are present in the left lobe and the isthmus, one benign lymph node, fragment of prathyroid \par        labs reviewed \par        10/2015 DEXA scan normal spine and hip, mildly osteopenia forearm\par        iPTH elevated at 90, normal Ca normal Vit D, repeated iPTH 114 on 5/16\par        denies personal h/o kidney stones\par        denies h/o fractures\par        thyrogen stimulated WBS 6/6/2016 normal\par        thyrogen stim thyroglobulin level 6/6/2016 at [place] Tg <0.2 ng/ML, atg <20\par        thyrogen stimulated whole body scan negative 6/14/17\par        thyroglobulin level 6/19/17 0.7 negative ab.

## 2022-02-03 ENCOUNTER — APPOINTMENT (OUTPATIENT)
Dept: ENDOCRINOLOGY | Facility: CLINIC | Age: 57
End: 2022-02-03
Payer: COMMERCIAL

## 2022-02-03 VITALS — HEIGHT: 61 IN | WEIGHT: 132 LBS | BODY MASS INDEX: 24.92 KG/M2

## 2022-02-03 PROCEDURE — 99215 OFFICE O/P EST HI 40 MIN: CPT | Mod: 95

## 2022-02-03 NOTE — HISTORY OF PRESENT ILLNESS
[Home] : at home, [unfilled] , at the time of the visit. [Medical Office: ([facility])___] : at the medical office located in  [Verbal consent obtained from patient] : the patient, [unfilled] [FreeTextEntry1] :  55 yo WW coming for f/u papillary thyroid cancer\par 2/18 As compared to the patient's baseline study dated 10/13/2015, there has been no statistically \par significant change in bone density at the lumbar spine or the left hip with a statistically \par significant interval decrease in bone density noted at the left forearm. \par \par        6/1/18 neck US normal done with Dr Navarro at [place]\par        had total thyroidectomy 6/2009 done by Dr Laine Sethi [facility], retired now\par        labs reviewed\par        her deramthologist felt the thyroid nodule in 2008, an Endo in [place] did FNA without US came back negative, then repeated US guided FNA at [facility] showed papillary thyroid cancer one year later\par        did not need BATRES\par        her sister had also papillary thyroid cancer \par        last labwork reviewed \par        TSH 0.09 mIU/L FT4 1.3 (0.8-1.8) same dose Synthroid WILLIE 112mcg qd decreased from 125mcg qd 9/18\par \par        Hg 10.5 (11.7 and above) TG 0.3, TG ab <1, Vit D 29\par        labs 2010 TSH 0.20, FT4 0.92\par        thyroid uptake and scan 2008 showed 1.4cm left aspect of the isthmus cold area, possible thyroid malignancy\par        3/2008 1.4cm slightly lobulatedhetherogenously hypoechoic solid noduleon the left of the isthmus\par        neck US 5/27/15 9.7mm hyperechoic structure to the right is not thought to be thyroid parenchima, althout that possibility is not entirely excluded\par        7/29/2015 74.8mCi I131\par        postradiation WBS done 8/5/2015 2 small focal areas radioaiodine concentration in the midline anterior neck suspicious for small metastases\par         thyroidectomy 7/6/2009 papillary thyroid carcinoma, classical type, well differentiatled, with psammoma bodies, left lobe, greatest diameter 1.3cm, completelly surrounded, focal capsular invasion, focal blood vessel invasion, extrathyroid extension not identified, surgical margins + tumor , microscopic foci are present in the left lobe and the isthmus, one benign lymph node, fragment of prathyroid \par        labs reviewed \par        10/2015 DEXA scan normal spine and hip, mildly osteopenia forearm\par        iPTH elevated at 90, normal Ca normal Vit D, repeated iPTH 114 on 5/16\par        denies personal h/o kidney stones\par        denies h/o fractures\par        thyrogen stimulated WBS 6/6/2016 normal\par        thyrogen stim thyroglobulin level 6/6/2016 at [place] Tg <0.2 ng/ML, atg <20\par        thyrogen stimulated whole body scan negative 6/14/17\par        thyroglobulin level 6/19/17 0.7 negative ab.

## 2022-03-03 ENCOUNTER — RESULT REVIEW (OUTPATIENT)
Age: 57
End: 2022-03-03

## 2022-03-03 ENCOUNTER — APPOINTMENT (OUTPATIENT)
Dept: HEMATOLOGY ONCOLOGY | Facility: CLINIC | Age: 57
End: 2022-03-03
Payer: COMMERCIAL

## 2022-03-03 VITALS
WEIGHT: 130 LBS | TEMPERATURE: 98.7 F | HEIGHT: 61 IN | RESPIRATION RATE: 18 BRPM | BODY MASS INDEX: 24.55 KG/M2 | DIASTOLIC BLOOD PRESSURE: 77 MMHG | HEART RATE: 93 BPM | OXYGEN SATURATION: 98 % | SYSTOLIC BLOOD PRESSURE: 121 MMHG

## 2022-03-03 DIAGNOSIS — Z71.83 ENCOUNTER FOR NONPROCREATIVE GENETIC COUNSELING: ICD-10-CM

## 2022-03-03 DIAGNOSIS — R63.4 ABNORMAL WEIGHT LOSS: ICD-10-CM

## 2022-03-03 DIAGNOSIS — R10.9 UNSPECIFIED ABDOMINAL PAIN: ICD-10-CM

## 2022-03-03 PROCEDURE — 36415 COLL VENOUS BLD VENIPUNCTURE: CPT

## 2022-03-03 PROCEDURE — 99205 OFFICE O/P NEW HI 60 MIN: CPT | Mod: 25

## 2022-03-03 NOTE — HISTORY OF PRESENT ILLNESS
[de-identified] : 56 year old female presents today for initial consultation of iron deficiency anemia, referred by Dr. Burton.  Labs dated 2022 show a HGB 10.2, HCT 31.6, Ferritin 4, % Saturation 6, total iron 31, TIBC 541.\par \par She states she has lost 23 lbs over the past 2 years- however she stopped drinking ETOH when COVID started, 1 glass of wine on occasions.   \par \par She had GI consult last week- colonoscopy was 2019, she had 1 pre cancerous polyp removed at the time- (Dr. Baird) She is scheduled for an EGD 3/10/2022 for ongoing GERD.  Depending on results they will schedule colonoscopy.  \par \par No menses x 10 years\par BMD 2020- Osteopenia T-score -1.9\par \par 18 neck US normal done with Dr Navarro at [place]\par SHe had total thyroidectomy 2009 done by Dr Laine Sethi [facility], retired now- Did not have BATRES at the time. 7 years later she went for labs- she had a reoccurrence and was found in her LN's- treated with BATRES \par \par Social Hx-\par Never a smoker\par Denies ETOH use\par Denies illicit drug use\par \par Family Hx-\par Personal hx of papillary thyroid cancer (44)  \par Sister with papillary cancer (23)\par Father lung cancer, melanoma, \par Paternal aunt- breast cancer 60's\par Paternal aunt- ovarian cancer 70's\par Maternal aunt- blood cancer\par Paternal first cousin- lymphoma  at 38\par Paternal cousin- uterine cancer 38\par Paternal cousin- prostate cancer late 40's\par \par Patient had genetic testing for BRCA which was negative in her 30's

## 2022-03-03 NOTE — CONSULT LETTER
[Dear  ___] : Dear  [unfilled], [Consult Letter:] : I had the pleasure of evaluating your patient, [unfilled]. [Consult Closing:] : Thank you very much for allowing me to participate in the care of this patient.  If you have any questions, please do not hesitate to contact me. [Please see my note below.] : Please see my note below. [Sincerely,] : Sincerely, [FreeTextEntry3] : Yakelin Vega MD\par [facility] [place] at [facility]\par

## 2022-03-03 NOTE — ASSESSMENT
[FreeTextEntry1] : 56 year old female,  presents today for initial consultation of iron deficiency anemia, referred by Dr. Burton.  Labs dated 2022 show a HGB 10.2, HCT 31.6, Ferritin 4, % Saturation 6, total iron 31, TIBC 541.\par \par She states she has lost 23 lbs over the past 2 years- however she stopped drinking ETOH when COVID started, 1 glass of wine on occasions.   \par \par She had GI consult last week- colonoscopy was , she had 1 pre cancerous polyp removed at the time- (Dr. Baird) She is scheduled for an EGD 3/10/2022 for ongoing GERD.  Depending on results they will schedule colonoscopy.  \par \par No menses x 10 years\par BMD 2020- Osteopenia T-score -1.9\par \par 17 \par She had total thyroidectomy 2009 done by Dr Laine Sethi [facility], she did not have BATRES at the time. 7 years later she went for labs- she had a reoccurrence and was found in her LN's- treated with BATRES 2016\par \par Social Hx-\par Never a smoker\par Denies ETOH use\par Denies illicit drug use\par \par Family Hx-\par Personal hx of papillary thyroid cancer (44)  \par Sister with papillary cancer (23)\par Father lung cancer, melanoma, \par Paternal aunt- breast cancer 60's\par Paternal aunt- ovarian cancer 70's\par Maternal aunt- blood cancer\par Paternal first cousin- lymphoma  at 38\par Paternal cousin- uterine cancer 38\par Paternal cousin- prostate cancer late 40's\par \par Patient had genetic testing for BRCA which was negative in her 30's\par \par \par # iron deficiency anemia - c/w blood loss.  Patient scheduled for GI work up next week, upper endoscopy.  SHe is intolerant to PO iron, schedule IV iron Injectafer x 2.  In differentiaL malignancy, gastritis, celiac disease. She will hold fish oil, cranberry and calcium supplement for a week.\par \par #weight loss- patient lost weight, she was on Metformin which might contribute to weight loss, but given anemia of blood loss schedule CT abdomen/ pelvis\par \par # Patient with strong family h/o malignancy, reviewed indication for genetic testing to r/o germline mutation.  Invitae profile send out.

## 2022-03-03 NOTE — REVIEW OF SYSTEMS
[Fatigue] : fatigue [Negative] : Allergic/Immunologic [Abdominal Pain] : abdominal pain [Recent Change In Weight] : ~T recent weight change

## 2022-03-22 ENCOUNTER — RESULT REVIEW (OUTPATIENT)
Age: 57
End: 2022-03-22

## 2022-03-28 NOTE — REVIEW OF SYSTEMS
[Fatigue] : fatigue [Recent Change In Weight] : ~T recent weight change [Abdominal Pain] : abdominal pain [Negative] : Allergic/Immunologic

## 2022-04-04 ENCOUNTER — RESULT REVIEW (OUTPATIENT)
Age: 57
End: 2022-04-04

## 2022-04-04 ENCOUNTER — APPOINTMENT (OUTPATIENT)
Dept: HEMATOLOGY ONCOLOGY | Facility: CLINIC | Age: 57
End: 2022-04-04
Payer: COMMERCIAL

## 2022-04-04 VITALS
SYSTOLIC BLOOD PRESSURE: 116 MMHG | BODY MASS INDEX: 24.35 KG/M2 | TEMPERATURE: 99 F | RESPIRATION RATE: 18 BRPM | WEIGHT: 129 LBS | OXYGEN SATURATION: 97 % | DIASTOLIC BLOOD PRESSURE: 66 MMHG | HEIGHT: 60.98 IN | HEART RATE: 88 BPM

## 2022-04-04 DIAGNOSIS — R63.4 ABNORMAL WEIGHT LOSS: ICD-10-CM

## 2022-04-04 PROCEDURE — 36415 COLL VENOUS BLD VENIPUNCTURE: CPT

## 2022-04-04 PROCEDURE — 99214 OFFICE O/P EST MOD 30 MIN: CPT | Mod: 25

## 2022-04-05 PROBLEM — R63.4 WEIGHT LOSS: Status: ACTIVE | Noted: 2022-03-03

## 2022-04-05 NOTE — ASSESSMENT
[FreeTextEntry1] : 56 year old female,  presents today for initial consultation of iron deficiency anemia, referred by Dr. Burton.  Labs dated 2022 show a HGB 10.2, HCT 31.6, Ferritin 4, % Saturation 6, total iron 31, TIBC 541.\par \par She states she has lost 23 lbs over the past 2 years- however she stopped drinking ETOH when COVID started, 1 glass of wine on occasions.   \par \par She had GI consult last week- colonoscopy was , she had 1 pre cancerous polyp removed at the time- (Dr. Baird) She is scheduled for an EGD 3/10/2022 for ongoing GERD.  Depending on results they will schedule colonoscopy.  \par \par No menses x 10 years\par BMD 2020- Osteopenia T-score -1.9\par \par  \par She had total thyroidectomy 2009 done by Dr Laine Sethi [facility], she did not have BATRES at the time. 7 years later she went for labs- she had a reoccurrence and was found in her LN's- treated with BATRES 2016\par \par Social Hx-\par Never a smoker\par Denies ETOH use\par Denies illicit drug use\par \par Family Hx-\par Personal hx of papillary thyroid cancer (44)  \par Sister with papillary cancer (23)\par Father lung cancer, melanoma, \par Paternal aunt- breast cancer 60's\par Paternal aunt- ovarian cancer 70's\par Maternal aunt- blood cancer\par Paternal first cousin- lymphoma  at 38\par Paternal cousin- uterine cancer 38\par Paternal cousin- prostate cancer late 40's\par \par Patient had genetic testing for BRCA which was negative in her 30's, repeated genetic testing 3/2022- negative\par \par # iron deficiency anemia - c/w blood loss, symptomatic. EGD unremarkable 3/22, schedule for colonoscopy.  If negative will request camera capsule study.  SHe is intolerant to PO iron, IV iron - Venofer approved. S/p 2 doses, no evidence of improvement in Hg value, but might take 4 weeks. \par \par #weight loss- patient lost weight, she was on Metformin which might contribute to weight loss, but given anemia of blood loss.  CT scan - no evidence of malignancy\par \par Blood drawn in office. Ordered and reviewed.\par \par

## 2022-04-05 NOTE — CONSULT LETTER
[Dear  ___] : Dear  [unfilled], [Consult Letter:] : I had the pleasure of evaluating your patient, [unfilled]. [Please see my note below.] : Please see my note below. [Consult Closing:] : Thank you very much for allowing me to participate in the care of this patient.  If you have any questions, please do not hesitate to contact me. [Sincerely,] : Sincerely, [FreeTextEntry3] : Yakelin Vega MD\par [facility] [place] at [facility]\par

## 2022-04-05 NOTE — HISTORY OF PRESENT ILLNESS
[de-identified] : 56 year old female presents today for initial consultation of iron deficiency anemia, referred by Dr. Burton.  Labs dated 2022 show a HGB 10.2, HCT 31.6, Ferritin 4, % Saturation 6, total iron 31, TIBC 541.\par \par She states she has lost 23 lbs over the past 2 years- however she stopped drinking ETOH when COVID started, 1 glass of wine on occasions.   \par \par She had GI consult last week- colonoscopy was 2019, she had 1 pre cancerous polyp removed at the time- (Dr. Baird) She is scheduled for an EGD 3/10/2022 for ongoing GERD.  Depending on results they will schedule colonoscopy.  \par \par No menses x 10 years\par BMD 2020- Osteopenia T-score -1.9\par \par 18 neck US normal done with Dr Navarro at [place]\par SHe had total thyroidectomy 2009 done by Dr Laine Sethi [facility], retired now- Did not have BATRES at the time. 7 years later she went for labs- she had a reoccurrence and was found in her LN's- treated with BATRES \par \par Social Hx-\par Never a smoker\par Denies ETOH use\par Denies illicit drug use\par \par Family Hx-\par Personal hx of papillary thyroid cancer (44)  \par Sister with papillary cancer (23)\par Father lung cancer, melanoma, \par Paternal aunt- breast cancer 60's\par Paternal aunt- ovarian cancer 70's\par Maternal aunt- blood cancer\par Paternal first cousin- lymphoma  at 38\par Paternal cousin- uterine cancer 38\par Paternal cousin- prostate cancer late 40's\par \par Patient had genetic testing for BRCA which was negative in her 30's

## 2022-04-12 ENCOUNTER — TRANSCRIPTION ENCOUNTER (OUTPATIENT)
Age: 57
End: 2022-04-12

## 2022-04-12 ENCOUNTER — NON-APPOINTMENT (OUTPATIENT)
Age: 57
End: 2022-04-12

## 2022-04-18 ENCOUNTER — RESULT REVIEW (OUTPATIENT)
Age: 57
End: 2022-04-18

## 2022-04-18 ENCOUNTER — APPOINTMENT (OUTPATIENT)
Dept: HEMATOLOGY ONCOLOGY | Facility: CLINIC | Age: 57
End: 2022-04-18
Payer: COMMERCIAL

## 2022-04-18 VITALS
RESPIRATION RATE: 16 BRPM | TEMPERATURE: 98.2 F | WEIGHT: 128.7 LBS | HEART RATE: 71 BPM | SYSTOLIC BLOOD PRESSURE: 121 MMHG | DIASTOLIC BLOOD PRESSURE: 77 MMHG | HEIGHT: 60.98 IN | BODY MASS INDEX: 24.3 KG/M2 | OXYGEN SATURATION: 97 %

## 2022-04-18 DIAGNOSIS — L50.9 URTICARIA, UNSPECIFIED: ICD-10-CM

## 2022-04-18 DIAGNOSIS — L28.2 OTHER PRURIGO: ICD-10-CM

## 2022-04-18 DIAGNOSIS — Z91.040 LATEX ALLERGY STATUS: ICD-10-CM

## 2022-04-18 PROCEDURE — 99214 OFFICE O/P EST MOD 30 MIN: CPT | Mod: 25

## 2022-04-18 PROCEDURE — 36415 COLL VENOUS BLD VENIPUNCTURE: CPT

## 2022-04-19 PROBLEM — L50.9 HIVES OF UNKNOWN ORIGIN: Status: ACTIVE | Noted: 2022-04-18

## 2022-04-19 PROBLEM — L28.2 PRURITIC RASH: Status: ACTIVE | Noted: 2022-04-19

## 2022-04-19 PROBLEM — Z91.040 LATEX ALLERGY: Status: ACTIVE | Noted: 2018-12-11

## 2022-04-19 NOTE — ASSESSMENT
[FreeTextEntry1] : 56 year old female,  presents today for initial consultation of iron deficiency anemia, referred by Dr. Burton.  Labs dated 2022 show a HGB 10.2, HCT 31.6, Ferritin 4, % Saturation 6, total iron 31, TIBC 541.\par \par She states she has lost 23 lbs over the past 2 years- however she stopped drinking ETOH when COVID started, 1 glass of wine on occasions.   \par \par She had GI consult last week- colonoscopy was , she had 1 pre cancerous polyp removed at the time- (Dr. Baird) She is scheduled for an EGD 3/10/2022 for ongoing GERD.  Depending on results they will schedule colonoscopy.  \par \par No menses x 10 years\par BMD 2020- Osteopenia T-score -1.9\par \par  \par She had total thyroidectomy 2009 done by Dr Laine Sethi [facility], she did not have BATRES at the time. 7 years later she went for labs- she had a reoccurrence and was found in her LN's- treated with BATRES 2016\par \par Social Hx-\par Never a smoker\par Denies ETOH use\par Denies illicit drug use\par \par Family Hx-\par Personal hx of papillary thyroid cancer (44)  \par Sister with papillary cancer (23)\par Father lung cancer, melanoma, \par Paternal aunt- breast cancer 60's\par Paternal aunt- ovarian cancer 70's\par Maternal aunt- blood cancer\par Paternal first cousin- lymphoma  at 38\par Paternal cousin- uterine cancer 38\par Paternal cousin- prostate cancer late 40's\par \par Patient had genetic testing for BRCA which was negative in her 30's, repeated genetic testing 3/2022- negative\par \par # iron deficiency anemia - c/w blood loss, symptomatic. EGD unremarkable 3/22, schedule for colonoscopy.  If negative will request camera capsule study.  SHe is intolerant to PO iron, IV iron - Venofer approved. S/p 2 doses, no evidence of improvement in Hg value, but might take 4 weeks. \par \par Patient developed extensive pruritic rash after 3rd dose of IV venofer on 2022.  She received daily Claritin, Prednisone 40 mg x 5 days with no improvement in 10 days.  She had colonoscopy which was unrevealing in the last week. Change to Zyrtec, increase to 2 BID, hydroxyzine. If no improvement - resume steroids. CBC - peripheral blood with eosinophilia - from 100 to 500 absolute number. \par \par #weight loss- patient lost weight, she was on Metformin which might contribute to weight loss, but given anemia of blood loss.  CT scan - no evidence of malignancy\par \par Blood drawn in office. Ordered and reviewed.\par \par

## 2022-04-19 NOTE — HISTORY OF PRESENT ILLNESS
[de-identified] : 56 year old female presents today for initial consultation of iron deficiency anemia, referred by Dr. Burton.  Labs dated 2022 show a HGB 10.2, HCT 31.6, Ferritin 4, % Saturation 6, total iron 31, TIBC 541.\par \par She states she has lost 23 lbs over the past 2 years- however she stopped drinking ETOH when COVID started, 1 glass of wine on occasions.   \par \par She had GI consult last week- colonoscopy was 2019, she had 1 pre cancerous polyp removed at the time- (Dr. Baird) She is scheduled for an EGD 3/10/2022 for ongoing GERD.  Depending on results they will schedule colonoscopy.  \par \par No menses x 10 years\par BMD 2020- Osteopenia T-score -1.9\par \par 18 neck US normal done with Dr Navarro at [place]\par SHe had total thyroidectomy 2009 done by Dr Laine Sethi [facility], retired now- Did not have BATRES at the time. 7 years later she went for labs- she had a reoccurrence and was found in her LN's- treated with BATRES \par \par Social Hx-\par Never a smoker\par Denies ETOH use\par Denies illicit drug use\par \par Family Hx-\par Personal hx of papillary thyroid cancer (44)  \par Sister with papillary cancer (23)\par Father lung cancer, melanoma, \par Paternal aunt- breast cancer 60's\par Paternal aunt- ovarian cancer 70's\par Maternal aunt- blood cancer\par Paternal first cousin- lymphoma  at 38\par Paternal cousin- uterine cancer 38\par Paternal cousin- prostate cancer late 40's\par \par Patient had genetic testing for BRCA which was negative in her 30's

## 2022-04-19 NOTE — PHYSICAL EXAM
[Fully active, able to carry on all pre-disease performance without restriction] : Status 0 - Fully active, able to carry on all pre-disease performance without restriction [Normal] : affect appropriate [de-identified] : extensive pruritic rash - papular to plaques, abdomen, back, buttocks.

## 2022-04-19 NOTE — DISCUSSION/SUMMARY
[FreeTextEntry1] : Patient reacted with rash and pruritus after 3rd venofer.  Hold further infusion now, plan to follow up with colonoscopy.

## 2022-06-07 ENCOUNTER — APPOINTMENT (OUTPATIENT)
Dept: ENDOCRINOLOGY | Facility: CLINIC | Age: 57
End: 2022-06-07

## 2022-06-07 ENCOUNTER — APPOINTMENT (OUTPATIENT)
Dept: ENDOCRINOLOGY | Facility: CLINIC | Age: 57
End: 2022-06-07
Payer: COMMERCIAL

## 2022-06-07 PROCEDURE — 99215 OFFICE O/P EST HI 40 MIN: CPT | Mod: 95

## 2022-06-07 RX ORDER — CALCIUM CARBONATE 500(1250)
500 TABLET ORAL
Refills: 0 | Status: DISCONTINUED | COMMUNITY
End: 2022-06-07

## 2022-06-07 RX ORDER — ESOMEPRAZOLE MAGNESIUM 20 MG/1
20 CAPSULE, DELAYED RELEASE ORAL DAILY
Refills: 0 | Status: DISCONTINUED | COMMUNITY
End: 2022-06-07

## 2022-06-07 RX ORDER — VIT A AND D3 IN COD LIVER OIL 1250-135
450 CAPSULE ORAL
Refills: 0 | Status: DISCONTINUED | COMMUNITY
End: 2022-06-07

## 2022-06-07 RX ORDER — FERROUS SULFATE 137(45) MG
TABLET, EXTENDED RELEASE ORAL
Refills: 0 | Status: DISCONTINUED | COMMUNITY
End: 2022-06-07

## 2022-06-07 RX ORDER — ACETAMINOPHEN 500 MG
1000 TABLET ORAL
Refills: 0 | Status: DISCONTINUED | COMMUNITY
End: 2022-06-07

## 2022-06-07 NOTE — HISTORY OF PRESENT ILLNESS
[Home] : at home, [unfilled] , at the time of the visit. [Medical Office: ([facility])___] : at the medical office located in  [Verbal consent obtained from patient] : the patient, [unfilled] [FreeTextEntry1] :  55 yo WW coming for f/u papillary thyroid cancer\par Seen hematology oncology for anemia\par CAT scan was done to figure out the reason for her anemia was told fatty liver\par 2/18 As compared to the patient's baseline study dated 10/13/2015, there has been no statistically \par significant change in bone density at the lumbar spine or the left hip with a statistically \par significant interval decrease in bone density noted at the left forearm. \par \par stopped Vascepta, Cranberry , Calcium\par \par        6/1/18 neck US normal done with Dr Navarro at [place]\par        had total thyroidectomy 6/2009 done by Dr Laine Sethi [facility], retired now\par        labs reviewed\par        her deramthologist felt the thyroid nodule in 2008, an Endo in [place] did FNA without US came back negative, then repeated US guided FNA at [facility] showed papillary thyroid cancer one year later\par        did not need BATRES\par        her sister had also papillary thyroid cancer \par        last labwork reviewed \par        TSH 0.09 mIU/L FT4 1.3 (0.8-1.8) same dose Synthroid WILLIE 112mcg qd decreased from 125mcg qd 9/18\par \par        Hg 10.5 (11.7 and above) TG 0.3, TG ab <1, Vit D 29\par        labs 2010 TSH 0.20, FT4 0.92\par        thyroid uptake and scan 2008 showed 1.4cm left aspect of the isthmus cold area, possible thyroid malignancy\par        3/2008 1.4cm slightly lobulatedhetherogenously hypoechoic solid noduleon the left of the isthmus\par        neck US 5/27/15 9.7mm hyperechoic structure to the right is not thought to be thyroid parenchima, althout that possibility is not entirely excluded\par        7/29/2015 74.8mCi I131\par        postradiation WBS done 8/5/2015 2 small focal areas radioaiodine concentration in the midline anterior neck suspicious for small metastases\par         thyroidectomy 7/6/2009 papillary thyroid carcinoma, classical type, well differentiatled, with psammoma bodies, left lobe, greatest diameter 1.3cm, completelly surrounded, focal capsular invasion, focal blood vessel invasion, extrathyroid extension not identified, surgical margins + tumor , microscopic foci are present in the left lobe and the isthmus, one benign lymph node, fragment of prathyroid \par        labs reviewed \par        10/2015 DEXA scan normal spine and hip, mildly osteopenia forearm\par        iPTH elevated at 90, normal Ca normal Vit D, repeated iPTH 114 on 5/16\par        denies personal h/o kidney stones\par        denies h/o fractures\par        thyrogen stimulated WBS 6/6/2016 normal\par        thyrogen stim thyroglobulin level 6/6/2016 at [place] Tg <0.2 ng/ML, atg <20\par        thyrogen stimulated whole body scan negative 6/14/17\par        thyroglobulin level 6/19/17 0.7 negative ab.

## 2022-06-14 ENCOUNTER — APPOINTMENT (OUTPATIENT)
Dept: GASTROENTEROLOGY | Facility: CLINIC | Age: 57
End: 2022-06-14
Payer: COMMERCIAL

## 2022-06-14 ENCOUNTER — NON-APPOINTMENT (OUTPATIENT)
Age: 57
End: 2022-06-14

## 2022-06-14 VITALS
HEART RATE: 86 BPM | WEIGHT: 128 LBS | HEIGHT: 60.98 IN | SYSTOLIC BLOOD PRESSURE: 100 MMHG | OXYGEN SATURATION: 98 % | BODY MASS INDEX: 24.17 KG/M2 | DIASTOLIC BLOOD PRESSURE: 80 MMHG | TEMPERATURE: 97.4 F

## 2022-06-14 PROCEDURE — 99204 OFFICE O/P NEW MOD 45 MIN: CPT

## 2022-06-14 RX ORDER — HYDROXYZINE HYDROCHLORIDE 25 MG/1
25 TABLET ORAL
Qty: 60 | Refills: 0 | Status: DISCONTINUED | COMMUNITY
Start: 2022-04-18 | End: 2022-06-14

## 2022-06-14 NOTE — ASSESSMENT
[FreeTextEntry1] : Will schedule a small bowel video capsule study for iron deficiency anemia.\par \par Thank you for referring Ms. HILLS.  Please do not hesitate to call to further discuss his/her care.\par \par Note faxed to requesting MD.\par \par

## 2022-06-14 NOTE — HISTORY OF PRESENT ILLNESS
[FreeTextEntry1] : Ms. Diaz is a pleasant 56F h/o papillary thyroid cancer age 43 s/p resection, recurrence s/p treatment, hyperparathyroidism, osteopenia, pre-DM who is referred by Dr. Vega for iron deficiency anemia.\par \par Recently underwent a workup with Dr. Baird including an EGD 3/10/22 (normal), colonoscopy within the past several months (no source of anemia), is referred for a capsule study.\par \par Normal brown BM every several days, no blood, no black stool.  No guaiac positive test that she knows about.\par \par She has been receiving IV iron infusions, was taking oral iron which she has since stopped.\par \par Labs 2/11/22:\par H/H 10.2/31.6\par Ferritin 4\par Fe 31\par TIBC 541\par % sat 6%\par \par Does not smoke.  Quit drinking during the COVID pandemic with a resultant 20lb weight loss.\par \par No FHx of any GI malignancies, although multiple other malignancies.

## 2022-06-19 ENCOUNTER — RESULT REVIEW (OUTPATIENT)
Age: 57
End: 2022-06-19

## 2022-06-22 ENCOUNTER — APPOINTMENT (OUTPATIENT)
Dept: GASTROENTEROLOGY | Facility: HOSPITAL | Age: 57
End: 2022-06-22

## 2022-07-05 ENCOUNTER — APPOINTMENT (OUTPATIENT)
Dept: HEMATOLOGY ONCOLOGY | Facility: CLINIC | Age: 57
End: 2022-07-05

## 2022-07-05 ENCOUNTER — RESULT REVIEW (OUTPATIENT)
Age: 57
End: 2022-07-05

## 2022-07-05 VITALS
RESPIRATION RATE: 16 BRPM | HEART RATE: 71 BPM | BODY MASS INDEX: 24.18 KG/M2 | SYSTOLIC BLOOD PRESSURE: 95 MMHG | OXYGEN SATURATION: 96 % | DIASTOLIC BLOOD PRESSURE: 59 MMHG | TEMPERATURE: 97.2 F | HEIGHT: 60.98 IN | WEIGHT: 128.06 LBS

## 2022-07-05 DIAGNOSIS — R91.1 SOLITARY PULMONARY NODULE: ICD-10-CM

## 2022-07-05 PROCEDURE — 36415 COLL VENOUS BLD VENIPUNCTURE: CPT

## 2022-07-05 PROCEDURE — 99214 OFFICE O/P EST MOD 30 MIN: CPT | Mod: 25

## 2022-07-05 RX ORDER — AZITHROMYCIN 250 MG/1
250 TABLET, FILM COATED ORAL
Qty: 6 | Refills: 0 | Status: COMPLETED | COMMUNITY
Start: 2022-05-21

## 2022-07-05 RX ORDER — ESOMEPRAZOLE MAGNESIUM 40 MG/1
40 CAPSULE, DELAYED RELEASE ORAL
Qty: 90 | Refills: 0 | Status: COMPLETED | COMMUNITY
Start: 2022-01-18

## 2022-07-05 RX ORDER — FAMOTIDINE 20 MG/1
20 TABLET, FILM COATED ORAL
Qty: 10 | Refills: 0 | Status: COMPLETED | COMMUNITY
Start: 2022-04-10

## 2022-07-05 RX ORDER — AMOXICILLIN 500 MG/1
500 CAPSULE ORAL
Qty: 21 | Refills: 0 | Status: COMPLETED | COMMUNITY
Start: 2022-06-22

## 2022-07-05 RX ORDER — PERMETHRIN 50 MG/G
5 CREAM TOPICAL
Qty: 60 | Refills: 0 | Status: COMPLETED | COMMUNITY
Start: 2022-04-21

## 2022-07-05 RX ORDER — COVID-19 MOLECULAR TEST ASSAY
KIT MISCELLANEOUS
Qty: 8 | Refills: 0 | Status: COMPLETED | COMMUNITY
Start: 2022-05-21

## 2022-07-05 RX ORDER — DEXLANSOPRAZOLE 60 MG/1
60 CAPSULE, DELAYED RELEASE ORAL
Qty: 180 | Refills: 0 | Status: COMPLETED | COMMUNITY
Start: 2022-03-10

## 2022-07-05 RX ORDER — FLUCONAZOLE 150 MG/1
150 TABLET ORAL
Qty: 1 | Refills: 0 | Status: COMPLETED | COMMUNITY
Start: 2022-06-22

## 2022-07-05 RX ORDER — TRIAMCINOLONE ACETONIDE 1 MG/G
0.1 CREAM TOPICAL
Qty: 454 | Refills: 0 | Status: COMPLETED | COMMUNITY
Start: 2022-04-19

## 2022-07-05 RX ORDER — PREDNISONE 20 MG/1
20 TABLET ORAL
Qty: 10 | Refills: 0 | Status: COMPLETED | COMMUNITY
Start: 2022-04-10

## 2022-07-05 RX ORDER — SODIUM SULFATE, MAGNESIUM SULFATE, AND POTASSIUM CHLORIDE 17.75; 2.7; 2.25 G/1; G/1; G/1
1479-225-188 TABLET ORAL
Qty: 24 | Refills: 0 | Status: COMPLETED | COMMUNITY
Start: 2022-04-05

## 2022-07-05 RX ORDER — PANTOPRAZOLE 40 MG/1
40 TABLET, DELAYED RELEASE ORAL
Qty: 180 | Refills: 0 | Status: ACTIVE | COMMUNITY
Start: 2022-06-02

## 2022-07-05 NOTE — HISTORY OF PRESENT ILLNESS
[de-identified] : 56 year old female presents today for initial consultation of iron deficiency anemia, referred by Dr. Burton.  Labs dated 2022 show a HGB 10.2, HCT 31.6, Ferritin 4, % Saturation 6, total iron 31, TIBC 541.\par \par She states she has lost 23 lbs over the past 2 years- however she stopped drinking ETOH when COVID started, 1 glass of wine on occasions.   \par \par She had GI consult last week- colonoscopy was 2019, she had 1 pre cancerous polyp removed at the time- (Dr. Baird) She is scheduled for an EGD 3/10/2022 for ongoing GERD.  Depending on results they will schedule colonoscopy.  \par \par No menses x 10 years\par BMD 2020- Osteopenia T-score -1.9\par \par 18 neck US normal done with Dr Navarro at [place]\par SHe had total thyroidectomy 2009 done by Dr Laine Sethi [facility], retired now- Did not have BATRES at the time. 7 years later she went for labs- she had a reoccurrence and was found in her LN's- treated with BATRES \par \par Social Hx-\par Never a smoker\par Denies ETOH use\par Denies illicit drug use\par \par Family Hx-\par Personal hx of papillary thyroid cancer (43)  \par Sister with papillary cancer (23)\par Father lung cancer, melanoma, \par Paternal aunt- breast cancer 60's\par Paternal aunt- ovarian cancer 70's\par Maternal aunt- blood cancer\par Paternal first cousin- lymphoma  at 38\par Paternal cousin- uterine cancer 38\par Paternal cousin- prostate cancer late 40's\par \par Patient had genetic testing for BRCA which was negative in her 30's [de-identified] : Patient is f/u regarding anemia. Patient states that she feels well. No physical complaints at this time.

## 2022-07-05 NOTE — PHYSICAL EXAM
[Fully active, able to carry on all pre-disease performance without restriction] : Status 0 - Fully active, able to carry on all pre-disease performance without restriction [Normal] : affect appropriate [de-identified] : extensive pruritic rash - papular to plaques, abdomen, back, buttocks.

## 2022-07-05 NOTE — ASSESSMENT
[FreeTextEntry1] : 56 year old female,  presents today for initial consultation of iron deficiency anemia, referred by Dr. Burton.  Labs dated 2022 show a HGB 10.2, HCT 31.6, Ferritin 4, % Saturation 6, total iron 31, TIBC 541.\par \par She states she has lost 23 lbs over the past 2 years- however she stopped drinking ETOH when COVID started, 1 glass of wine on occasions.   \par \par She had GI consult last week- colonoscopy was , she had 1 pre cancerous polyp removed at the time- (Dr. Baird). Gi work up negative, including capsule study.  Patient on long term PPI, likely contributing to iron deficiency. \par No menses x 10 years\par BMD 2020- Osteopenia T-score -1.9, due now\par \par  She had total thyroidectomy 2009 done by Dr Laine Sethi [facility], she did not have BATRES at the time. 7 years later she went for labs- she had a reoccurrence and was found in her LN's- treated with BATRES 2016\par \par Social Hx-\par Never a smoker\par Denies ETOH use\par Denies illicit drug use\par \par Family Hx-\par Personal hx of papillary thyroid cancer (43)  \par Sister with papillary cancer (23)\par Father lung cancer, melanoma, \par Paternal aunt- breast cancer 60's\par Paternal aunt- ovarian cancer 70's\par Maternal aunt- blood cancer\par Paternal first cousin- lymphoma  at 38\par Paternal cousin- uterine cancer 38\par Paternal cousin- prostate cancer late 40's\par \par Patient had genetic testing for BRCA which was negative in her 30's, repeated genetic testing 3/2022- negative\par \par # iron deficiency anemia - c/w blood loss, symptomatic. GI work up negative. Patient on chronic PPI, likely contributing to malabsorption. \par She is intolerant to PO iron, IV iron - Venofer approved.\par - s/p Venofer x 3 200 mg, 3/25- 2022, ferritin 56 2022. \par Start oral Fe every other day. \par Ferritin checked - 80 \par \par #weight loss- patient lost weight, she was on Metformin which might contribute to weight loss, but given anemia of blood loss.  CT scan - no evidence of malignancy\par \par # Lung nodules- repeat CT \par \par Blood drawn in office. Ordered and reviewed.\par \par

## 2022-08-05 ENCOUNTER — APPOINTMENT (OUTPATIENT)
Dept: GYNECOLOGIC ONCOLOGY | Facility: CLINIC | Age: 57
End: 2022-08-05

## 2022-08-05 VITALS
HEIGHT: 60 IN | DIASTOLIC BLOOD PRESSURE: 77 MMHG | BODY MASS INDEX: 25.32 KG/M2 | WEIGHT: 129 LBS | HEART RATE: 88 BPM | SYSTOLIC BLOOD PRESSURE: 120 MMHG | OXYGEN SATURATION: 97 %

## 2022-08-05 DIAGNOSIS — Z80.8 FAMILY HISTORY OF MALIGNANT NEOPLASM OF OTHER ORGANS OR SYSTEMS: ICD-10-CM

## 2022-08-05 DIAGNOSIS — Z80.41 FAMILY HISTORY OF MALIGNANT NEOPLASM OF OVARY: ICD-10-CM

## 2022-08-05 DIAGNOSIS — Z80.3 FAMILY HISTORY OF MALIGNANT NEOPLASM OF BREAST: ICD-10-CM

## 2022-08-05 PROCEDURE — 99205 OFFICE O/P NEW HI 60 MIN: CPT

## 2022-08-09 LAB
HPV 16 E6+E7 MRNA CVX QL NAA+PROBE: NOT DETECTED
HPV HIGH+LOW RISK DNA PNL CVX: NOT DETECTED
HPV18+45 E6+E7 MRNA CVX QL NAA+PROBE: NOT DETECTED

## 2022-08-09 NOTE — HISTORY OF PRESENT ILLNESS
[FreeTextEntry1] : 56yo P2 LMP 2012 w/ hx of right ovarian cyst followed since 2020 at [facility] (former gynonc Dr. Wilson). Patient has never been symptomatic from this cyst and recommendation was for surveillance only. She has not has consistent GYN care since Dr. Wilson left [facility] and last sono was 3/2021 (see below) and plan againw as for surveillance. Patient had more recent CT scan for work up of iron deficiency anemia and ovarian cyst was again described. Today she denies pain/fever/bloating/bleeding. She reports normal urination and BMs. She is up to date with PCP and follows with [facility] for thyroid cancer hx and with Dr. Vega for iron deficiency anemia.\par \par Pmhx: thyroid ca, iron deficiency anemia, pre-DM\par Surghx:myomectomy 1995, thyroidectomy 2009, LEEP and CKC years ago\par OBGYNhx: +remote hx of abnl paps, most recent normal, + hx of fibroids/cysts, no stis\par Famhx: hx of lung ca , paternal aunt ovary ca, breast cagyn ca, breast ca, colon ca\par Sochx: denies\par \par Mammogram 9/2021: wnl, due 9/2022\par Colonoscopy and EGD 2022 = wnl\par Pap: due, prior WNL as per patient\par \par Pelvic sono 3/2021: uterus RV, EE 2.3mm, left ovary not seen. right ovary with thin walled anechoic, multilocular cyst that measures 3.2x1.8x3.2cm (prior scan 3/2020 measured 4.5x3.9x3.1cm), contains multipls subcentimeter ndules within the walls and septations. no pathologic blood flow.\par \par CT scan 3/2022 = IMPRESSION:\par \par  A few nonspecific micronodular densities within the lungs measuring up to 4 mm are statistically\par likely to be benign. However, 6 month follow-up may be obtained if there is significant clinical\par suspicion for neoplasm/metastatic disease.\par \par  Possible 2 cm right ovarian cyst, which maybe further evaluated with transvaginal ultrasound as\par clinically indicated.\par \par  Otherwise, grossly unremarkable appearance of the chest, abdomen and pelvis without suspicious mass\par or adenopathy.\par \par

## 2022-08-09 NOTE — LETTER BODY
[Dear  ___] : Dear  [unfilled], [FreeTextEntry2] : \par Thank you for referring CARMEN HILLS to [facility]necologic [facility]. I had the pleasure of meeting with Ms. CARMEN HILLS on 8/5/22. Please find my consultation note attached. Thank you for your trust and confidence in me and our practice, it means a great deal to us. Please feel free to contact me at anytime.\par Sincerely, \par \par Dr. Sanna Michel\par Office: [phone]\par Fax: [phone]\par

## 2022-08-09 NOTE — DISCUSSION/SUMMARY
[Reviewed Radiology Report(s)] : Radiology reports were reviewed. [Discuss Alternatives/Risks/Benefits w/Patient] : All alternatives, risks, and benefits were discussed with the patient/family and all questions were answered.  Patient expressed good understanding and appreciates the importance of follow up as recommended. [Visit Time ___ Minutes] : [unfilled] minutes [Face to Face Time___ Minutes] : with [unfilled] minutes in face to face consultation. [FreeTextEntry1] : 56yo P2 w/ persistent right ovarian cyst, decreasing in size based on prior sono. Needs follow up sono.\par -more than 50% of visit spent face to face with patient reviewing records and interpreting imaging/path/lab results, counseling and coordinating care.  I reviewed benign vs malignant etiology of ovarian cysts. I explained that in her case cyst appears benign and has been stable with suggestion of decreasing size on recent CT scan  imaging. I recommended that next step is for follow up pelvic sono to compare to images from last year. all questions answered and patient expressed understanding. I also agreed that surgical intervention is not necessary at this time unless f/u ultrasound reveals new / concerning findings. patient agrees.\par -rpt pelvic sono ordered for more current imaging\par -pap today for health maintenance\par -rtc 2wks televisit for results review and to finalize treatment plan\par -pain/fever/bleeding precautions given\par

## 2022-08-11 LAB — CYTOLOGY CVX/VAG DOC THIN PREP: ABNORMAL

## 2022-08-12 ENCOUNTER — RESULT REVIEW (OUTPATIENT)
Age: 57
End: 2022-08-12

## 2022-08-16 ENCOUNTER — APPOINTMENT (OUTPATIENT)
Dept: GYNECOLOGIC ONCOLOGY | Facility: CLINIC | Age: 57
End: 2022-08-16

## 2022-08-16 PROCEDURE — 99212 OFFICE O/P EST SF 10 MIN: CPT | Mod: 95

## 2022-08-16 NOTE — DISCUSSION/SUMMARY
[Reviewed Radiology Report(s)] : Radiology reports were reviewed. [Discuss Alternatives/Risks/Benefits w/Patient] : All alternatives, risks, and benefits were discussed with the patient/family and all questions were answered.  Patient expressed good understanding and appreciates the importance of follow up as recommended. [Visit Time ___ Minutes] : [unfilled] minutes [FreeTextEntry1] : 58yo w/ persistent complex right ovarian cyst, stable and slightly decreased in size from sono 1yr ago. Asymptomatic. Plan to continue surveillance.\par -entire televisit via video spent counseling patient and coordinating care. I reviewed stability/ slight decrease in size of cyst. reviewed likely benign nature of the cyst.\par -recommended annual surveillance with pelvic sono\par -pt can return to me in 1yr for annual visit and sono review. pt to continue care with PCP for general  health maintenance\par -pap normal and no abnl pap history in the last 3-5yrs therefor  patient due for next pap smear 2025 as per guidelines\par -rtc 1yr (pt to call and make appt after she does her sono)\par -pain/fever/bleeding precautions given\par  \par

## 2022-08-16 NOTE — HISTORY OF PRESENT ILLNESS
[Home] : at home, [unfilled] , at the time of the visit. [Medical Office: ([facility])___] : at the medical office located in  [Verbal consent obtained from patient] : the patient, [unfilled] [FreeTextEntry1] : 56yo P2 w/ persistent right ovarian cyst, decreasing in size based on prior sono. Televisit today to review results of f/u sono and pap smear. Since last visit patient reports doing well. She has no complaints and no new symptoms.\par \par Pap 8/2022: NILM/HPV neg\par \par \par Pelvic sono results reviewed with patient and patient reassured that when comparing cyst to her sono from [facility] in 3/2021, the cyst is actually smaller. Explained to patient that comparing sono to CT measurements is not reliable.\par \par Pelvic sono 8/12/22: FINDINGS:\par \par  UTERUS:  Uterus: Retroverted. The uterus measures 5.2 x 2.6 x 3.4 cm.  Right-sided and posterior\par myometrial fibroids, measuring up to 1.0 x 0.8 x 1.0 cm. The endometrial stripe measures 1.7 mm in\par AP diameter which is within normal limits for a postmenopausal patient. Trace amount of fluid in the\par endometrial canal.\par  RIGHT OVARY: The right ovary is measured 3.2 x 2.0 x 2.6 cm. The right ovary is predominantly\par composed of a complex cystic lesion with irregular internal septations. On the prior CT, a 2 cm cyst\par was seen in the right ovary. Arterial flow is noted in the right ovary.\par  LEFT OVARY: The left ovary is not visualized due to overlying bowel gas.\par  FREE FLUID: No free fluid is seen in the cul de sac.\par \par \par  IMPRESSION:\par  The right ovary is measured 3.2 x 2.0 x 2.6, increased since the prior CT. The right ovary is\par predominantly composed of a complex cystic lesion with irregular internal septations.  Given the\par irregularity of the septations and interval increased size, recommend pelvic MRI with and without\par intravenous contrast in order to exclude a cystic ovarian neoplasm.\par \par  Trace amount of fluid in the endocervical canal. No endometrial thickening.\par \par  Left ovary not visualized due to overlying bowel gas.\par \par  --- End of Report ---\par

## 2022-10-04 DIAGNOSIS — R22.1 LOCALIZED SWELLING, MASS AND LUMP, NECK: ICD-10-CM

## 2022-10-04 DIAGNOSIS — D64.9 ANEMIA, UNSPECIFIED: ICD-10-CM

## 2022-10-13 ENCOUNTER — APPOINTMENT (OUTPATIENT)
Dept: HEMATOLOGY ONCOLOGY | Facility: CLINIC | Age: 57
End: 2022-10-13

## 2022-10-13 ENCOUNTER — RESULT REVIEW (OUTPATIENT)
Age: 57
End: 2022-10-13

## 2022-10-13 VITALS
OXYGEN SATURATION: 96 % | TEMPERATURE: 97.4 F | WEIGHT: 132 LBS | RESPIRATION RATE: 16 BRPM | HEIGHT: 60 IN | DIASTOLIC BLOOD PRESSURE: 69 MMHG | BODY MASS INDEX: 25.91 KG/M2 | SYSTOLIC BLOOD PRESSURE: 104 MMHG | HEART RATE: 98 BPM

## 2022-10-13 PROCEDURE — 36415 COLL VENOUS BLD VENIPUNCTURE: CPT

## 2022-10-13 PROCEDURE — 99214 OFFICE O/P EST MOD 30 MIN: CPT | Mod: 25

## 2022-10-14 NOTE — ASSESSMENT
[FreeTextEntry1] : 57 year old female,  presents today for initial consultation of iron deficiency anemia, referred by Dr. Burton.  Labs dated 2022 show a HGB 10.2, HCT 31.6, Ferritin 4, % Saturation 6, total iron 31, TIBC 541.\par \par She states she has lost 23 lbs over the past 2 years- however she stopped drinking ETOH when COVID started, 1 glass of wine on occasions.   \par \par She had GI consult last week- colonoscopy was , she had 1 pre cancerous polyp removed at the time- (Dr. Baird). Gi work up negative, including capsule study.  Patient on long term PPI, likely contributing to iron deficiency. \par No menses x 10 years\par BMD 2020- Osteopenia T-score -1.9, due now\par \par She had total thyroidectomy 2009 done by Dr Laine Sethi [facility], she did not have BATRES at the time. 7 years later she went for labs- she had a reoccurrence and was found in her LN's- treated with BATRES 2016\par \par Social Hx-\par Never a smoker\par Denies ETOH use\par Denies illicit drug use\par \par Family Hx-\par Personal hx of papillary thyroid cancer (43)  \par Sister with papillary cancer (23)\par Father lung cancer, melanoma, \par Paternal aunt- breast cancer 60's\par Paternal aunt- ovarian cancer 70's\par Maternal aunt- blood cancer\par Paternal first cousin- lymphoma  at 38\par Paternal cousin- uterine cancer 38\par Paternal cousin- prostate cancer late 40's\par \par Patient had genetic testing for BRCA which was negative in her 30's, repeated genetic testing 3/2022- negative\par - ovarian cyst - followed by Dr. Omero Blackburn \par \par # iron deficiency anemia - c/w blood loss, symptomatic. GI work up negative. Patient on chronic PPI, likely contributing to malabsorption. \par She is intolerant to PO iron, IV iron - Venofer approved.\par - s/p Venofer x 3 200 mg, 3/25- 2022, ferritin 56 2022. \par Start oral Fe every other day. \par Ferritin checked - 80, continue oral iron \par \par #weight loss- patient lost weight, she was on Metformin which might contribute to weight loss, but given anemia of blood loss.  CT scan - no evidence of malignancy\par \par # Lung nodules- repeat CT \par - due for follow up \par \par Blood drawn in office. Ordered and reviewed.\par \par

## 2022-10-14 NOTE — PHYSICAL EXAM
[Fully active, able to carry on all pre-disease performance without restriction] : Status 0 - Fully active, able to carry on all pre-disease performance without restriction [Normal] : affect appropriate [de-identified] : extensive pruritic rash - papular to plaques, abdomen, back, buttocks.

## 2022-10-14 NOTE — HISTORY OF PRESENT ILLNESS
[de-identified] : 56 year old female presents today for initial consultation of iron deficiency anemia, referred by Dr. Burton.  Labs dated 2022 show a HGB 10.2, HCT 31.6, Ferritin 4, % Saturation 6, total iron 31, TIBC 541.\par \par She states she has lost 23 lbs over the past 2 years- however she stopped drinking ETOH when COVID started, 1 glass of wine on occasions.   \par \par She had GI consult last week- colonoscopy was 2019, she had 1 pre cancerous polyp removed at the time- (Dr. Baird) She is scheduled for an EGD 3/10/2022 for ongoing GERD.  Depending on results they will schedule colonoscopy.  \par \par No menses x 10 years\par BMD 2020- Osteopenia T-score -1.9\par \par 18 neck US normal done with Dr Navarro at [place]\par SHe had total thyroidectomy 2009 done by Dr Laine Sethi [facility], retired now- Did not have BATRES at the time. 7 years later she went for labs- she had a reoccurrence and was found in her LN's- treated with BATRES \par \par Social Hx-\par Never a smoker\par Denies ETOH use\par Denies illicit drug use\par \par Family Hx-\par Personal hx of papillary thyroid cancer (43)  \par Sister with papillary cancer (23)\par Father lung cancer, melanoma, \par Paternal aunt- breast cancer 60's\par Paternal aunt- ovarian cancer 70's\par Maternal aunt- blood cancer\par Paternal first cousin- lymphoma  at 38\par Paternal cousin- uterine cancer 38\par Paternal cousin- prostate cancer late 40's\par \par Patient had genetic testing for BRCA which was negative in her 30's [de-identified] : Patient is f/u regarding anemia. Patient states that she feels well. No physical complaints at this time.

## 2022-10-24 ENCOUNTER — APPOINTMENT (OUTPATIENT)
Dept: ENDOCRINOLOGY | Facility: CLINIC | Age: 57
End: 2022-10-24

## 2022-10-28 ENCOUNTER — APPOINTMENT (OUTPATIENT)
Dept: ENDOCRINOLOGY | Facility: CLINIC | Age: 57
End: 2022-10-28

## 2022-10-28 VITALS — WEIGHT: 132 LBS | HEIGHT: 60 IN | BODY MASS INDEX: 25.91 KG/M2

## 2022-10-28 PROCEDURE — 99215 OFFICE O/P EST HI 40 MIN: CPT | Mod: 95

## 2022-11-13 NOTE — HISTORY OF PRESENT ILLNESS
[Home] : at home, [unfilled] , at the time of the visit. [Medical Office: ([facility])___] : at the medical office located in  [Verbal consent obtained from patient] : the patient, [unfilled] [FreeTextEntry1] :  58 yo WW coming for f/u papillary thyroid cancer\par Seen hematology oncology for anemia\par CAT scan was done to figure out the reason for her anemia was told fatty liver\par 2/18 As compared to the patient's baseline study dated 10/13/2015, there has been no statistically \par significant change in bone density at the lumbar spine or the left hip with a statistically \par significant interval decrease in bone density noted at the left forearm. \par \par stopped Vascepta, Cranberry , Calcium\par \par        6/1/18 neck US normal done with Dr Navarro at [place]\par        had total thyroidectomy 6/2009 done by Dr Laine Sethi [facility], retired now\par        labs reviewed\par        her deramthologist felt the thyroid nodule in 2008, an Endo in [place] did FNA without US came back negative, then repeated US guided FNA at [facility] showed papillary thyroid cancer one year later\par        did not need BATRES\par        her sister had also papillary thyroid cancer \par        last labwork reviewed \par        TSH 0.09 mIU/L FT4 1.3 (0.8-1.8) same dose Synthroid WILLIE 112mcg qd decreased from 125mcg qd 9/18\par \par        Hg 10.5 (11.7 and above) TG 0.3, TG ab <1, Vit D 29\par        labs 2010 TSH 0.20, FT4 0.92\par        thyroid uptake and scan 2008 showed 1.4cm left aspect of the isthmus cold area, possible thyroid malignancy\par        3/2008 1.4cm slightly lobulatedhetherogenously hypoechoic solid noduleon the left of the isthmus\par        neck US 5/27/15 9.7mm hyperechoic structure to the right is not thought to be thyroid parenchima, althout that possibility is not entirely excluded\par        7/29/2015 74.8mCi I131\par        postradiation WBS done 8/5/2015 2 small focal areas radioaiodine concentration in the midline anterior neck suspicious for small metastases\par         thyroidectomy 7/6/2009 papillary thyroid carcinoma, classical type, well differentiatled, with psammoma bodies, left lobe, greatest diameter 1.3cm, completelly surrounded, focal capsular invasion, focal blood vessel invasion, extrathyroid extension not identified, surgical margins + tumor , microscopic foci are present in the left lobe and the isthmus, one benign lymph node, fragment of prathyroid \par        labs reviewed \par        10/2015 DEXA scan normal spine and hip, mildly osteopenia forearm\par        iPTH elevated at 90, normal Ca normal Vit D, repeated iPTH 114 on 5/16\par        denies personal h/o kidney stones\par        denies h/o fractures\par        thyrogen stimulated WBS 6/6/2016 normal\par        thyrogen stim thyroglobulin level 6/6/2016 at [place] Tg <0.2 ng/ML, atg <20\par        thyrogen stimulated whole body scan negative 6/14/17\par        thyroglobulin level 6/19/17 0.7 negative ab.

## 2023-03-15 ENCOUNTER — NON-APPOINTMENT (OUTPATIENT)
Age: 58
End: 2023-03-15

## 2023-04-27 ENCOUNTER — APPOINTMENT (OUTPATIENT)
Dept: HEMATOLOGY ONCOLOGY | Facility: CLINIC | Age: 58
End: 2023-04-27

## 2023-06-05 ENCOUNTER — RESULT REVIEW (OUTPATIENT)
Age: 58
End: 2023-06-05

## 2023-06-05 ENCOUNTER — APPOINTMENT (OUTPATIENT)
Dept: HEMATOLOGY ONCOLOGY | Facility: CLINIC | Age: 58
End: 2023-06-05
Payer: COMMERCIAL

## 2023-06-05 VITALS
RESPIRATION RATE: 16 BRPM | TEMPERATURE: 97.5 F | SYSTOLIC BLOOD PRESSURE: 133 MMHG | DIASTOLIC BLOOD PRESSURE: 60 MMHG | BODY MASS INDEX: 26.12 KG/M2 | HEART RATE: 87 BPM | HEIGHT: 60 IN | OXYGEN SATURATION: 96 % | WEIGHT: 133.06 LBS

## 2023-06-05 PROCEDURE — 99214 OFFICE O/P EST MOD 30 MIN: CPT | Mod: 25

## 2023-06-05 PROCEDURE — 36415 COLL VENOUS BLD VENIPUNCTURE: CPT

## 2023-06-05 RX ORDER — PNV NO.95/FERROUS FUM/FOLIC AC 28MG-0.8MG
TABLET ORAL
Refills: 0 | Status: ACTIVE | COMMUNITY

## 2023-06-05 NOTE — PHYSICAL EXAM
[Fully active, able to carry on all pre-disease performance without restriction] : Status 0 - Fully active, able to carry on all pre-disease performance without restriction [Normal] : affect appropriate [de-identified] : extensive pruritic rash - papular to plaques, abdomen, back, buttocks.

## 2023-06-05 NOTE — HISTORY OF PRESENT ILLNESS
[de-identified] : 56 year old female presents today for initial consultation of iron deficiency anemia, referred by Dr. Burton.  Labs dated 2022 show a HGB 10.2, HCT 31.6, Ferritin 4, % Saturation 6, total iron 31, TIBC 541.\par \par She states she has lost 23 lbs over the past 2 years- however she stopped drinking ETOH when COVID started, 1 glass of wine on occasions.   \par \par She had GI consult last week- colonoscopy was 2019, she had 1 pre cancerous polyp removed at the time- (Dr. Baird) She is scheduled for an EGD 3/10/2022 for ongoing GERD.  Depending on results they will schedule colonoscopy.  \par \par No menses x 10 years\par BMD 2020- Osteopenia T-score -1.9\par \par 18 neck US normal done with Dr Navarro at [place]\par SHe had total thyroidectomy 2009 done by Dr Laine Sethi [facility], retired now- Did not have BATRES at the time. 7 years later she went for labs- she had a reoccurrence and was found in her LN's- treated with BATRES \par \par Social Hx-\par Never a smoker\par Denies ETOH use\par Denies illicit drug use\par \par Family Hx-\par Personal hx of papillary thyroid cancer (44)  \par Sister with papillary cancer (23)\par Father lung cancer, melanoma, \par Paternal aunt- breast cancer 60's\par Paternal aunt- ovarian cancer 70's\par Maternal aunt- blood cancer\par Paternal first cousin- lymphoma  at 38\par Paternal cousin- uterine cancer 38\par Paternal cousin- prostate cancer late 40's\par \par Patient had genetic testing for BRCA which was negative in her 30's [de-identified] : Patient is f/u regarding anemia. Patient states that she feels well. No physical complaints at this time.

## 2023-06-05 NOTE — ASSESSMENT
[FreeTextEntry1] : 57 year old female,  presents today for initial consultation of iron deficiency anemia, referred by Dr. Burton.  Labs dated 2/11/2022 show a HGB 10.2, HCT 31.6, Ferritin 4, % Saturation 6, total iron 31, TIBC 541.\par \par She states she has lost 23 lbs over the past 2 years- however she stopped drinking ETOH when COVID started, 1 glass of wine on occasions.   \par \par She had GI consult last week- colonoscopy was 2019, she had 1 pre cancerous polyp removed at the time- (Dr. Baird). Gi work up negative, including capsule study.  Patient on long term PPI, likely contributing to iron deficiency. \par No menses x 10 years\par BMD 9/2020- Osteopenia T-score -1.9, due now\par \par She had total thyroidectomy 6/2009 done by Dr Laine Sethi [facility], she did not have BATRES at the time. 7 years later she went for labs- she had a reoccurrence and was found in her LN's- treated with BATRES 2016\par \par Patient had genetic testing for BRCA which was negative in her 30's, repeated genetic testing 3/2022- negative\par - ovarian cyst - followed by Dr. Omero Blackburn \par \par # iron deficiency anemia - c/w blood loss, symptomatic. GI work up negative. Patient on chronic PPI, likely contributing to malabsorption. \par She is intolerant to PO iron, IV iron - Venofer approved.\par - s/p Venofer x 3 200 mg, 3/25- 4/8/2022, ferritin 56 6/2/2022. \par Start oral Fe every other day. \par Ferritin checked - 80, continue oral iron \par \par #weight loss- patient lost weight, she was on Metformin which might contribute to weight loss, but given anemia of blood loss.  CT scan - no evidence of malignancy\par \par # Lung nodules- repeat CT 9/22\par - follow up 12/22- stable \par \par Blood drawn in office. Ordered and reviewed.\par \par

## 2023-06-06 ENCOUNTER — APPOINTMENT (OUTPATIENT)
Dept: ENDOCRINOLOGY | Facility: CLINIC | Age: 58
End: 2023-06-06
Payer: COMMERCIAL

## 2023-06-06 VITALS — BODY MASS INDEX: 25.91 KG/M2 | WEIGHT: 132 LBS | HEIGHT: 60 IN

## 2023-06-06 DIAGNOSIS — D50.9 IRON DEFICIENCY ANEMIA, UNSPECIFIED: ICD-10-CM

## 2023-06-06 PROCEDURE — 99215 OFFICE O/P EST HI 40 MIN: CPT | Mod: 95

## 2023-06-07 PROBLEM — D50.9 ANEMIA, IRON DEFICIENCY: Status: ACTIVE | Noted: 2022-03-03

## 2023-06-07 NOTE — HISTORY OF PRESENT ILLNESS
[Home] : at home, [unfilled] , at the time of the visit. [Medical Office: ([facility])___] : at the medical office located in  [Verbal consent obtained from patient] : the patient, [unfilled] [FreeTextEntry1] :  58 yo WW coming for f/u papillary thyroid cancer last seen 10/2022 \par Seen hematology oncology for anemia\par CAT scan was done to figure out the reason for her anemia was told fatty liver\par 2/18 As compared to the patient's baseline study dated 10/13/2015, there has been no statistically \par significant change in bone density at the lumbar spine or the left hip with a statistically \par significant interval decrease in bone density noted at the left forearm. \par \par stopped Vascepta, Cranberry , Calcium\par \par        6/1/18 neck US normal done with Dr Navarro at [place]\par        had total thyroidectomy 6/2009 done by Dr Laine Sethi [facility], retired now\par        labs reviewed\par        her deramthologist felt the thyroid nodule in 2008, an Endo in [place] did FNA without US came back negative, then repeated US guided FNA at [facility] showed papillary thyroid cancer one year later\par        did not need BATRES\par        her sister had also papillary thyroid cancer \par        last labwork reviewed \par        TSH 0.09 mIU/L FT4 1.3 (0.8-1.8) same dose Synthroid WILLIE 112mcg qd decreased from 125mcg qd 9/18\par \par        Hg 10.5 (11.7 and above) TG 0.3, TG ab <1, Vit D 29\par        labs 2010 TSH 0.20, FT4 0.92\par        thyroid uptake and scan 2008 showed 1.4cm left aspect of the isthmus cold area, possible thyroid malignancy\par        3/2008 1.4cm slightly lobulatedhetherogenously hypoechoic solid noduleon the left of the isthmus\par        neck US 5/27/15 9.7mm hyperechoic structure to the right is not thought to be thyroid parenchima, althout that possibility is not entirely excluded\par        7/29/2015 74.8mCi I131\par        postradiation WBS done 8/5/2015 2 small focal areas radioaiodine concentration in the midline anterior neck suspicious for small metastases\par         thyroidectomy 7/6/2009 papillary thyroid carcinoma, classical type, well differentiatled, with psammoma bodies, left lobe, greatest diameter 1.3cm, completelly surrounded, focal capsular invasion, focal blood vessel invasion, extrathyroid extension not identified, surgical margins + tumor , microscopic foci are present in the left lobe and the isthmus, one benign lymph node, fragment of prathyroid \par        labs reviewed \par        10/2015 DEXA scan normal spine and hip, mildly osteopenia forearm\par        iPTH elevated at 90, normal Ca normal Vit D, repeated iPTH 114 on 5/16\par        denies personal h/o kidney stones\par        denies h/o fractures\par        thyrogen stimulated WBS 6/6/2016 normal\par        thyrogen stim thyroglobulin level 6/6/2016 at [place] Tg <0.2 ng/ML, atg <20\par        thyrogen stimulated whole body scan negative 6/14/17\par        thyroglobulin level 6/19/17 0.7 negative ab.

## 2023-08-14 ENCOUNTER — RESULT REVIEW (OUTPATIENT)
Age: 58
End: 2023-08-14

## 2023-09-13 ENCOUNTER — APPOINTMENT (OUTPATIENT)
Dept: GYNECOLOGIC ONCOLOGY | Facility: CLINIC | Age: 58
End: 2023-09-13
Payer: COMMERCIAL

## 2023-09-13 VITALS — SYSTOLIC BLOOD PRESSURE: 108 MMHG | HEART RATE: 89 BPM | OXYGEN SATURATION: 96 % | DIASTOLIC BLOOD PRESSURE: 74 MMHG

## 2023-09-13 PROCEDURE — 99214 OFFICE O/P EST MOD 30 MIN: CPT

## 2023-09-14 ENCOUNTER — RX RENEWAL (OUTPATIENT)
Age: 58
End: 2023-09-14

## 2023-09-25 ENCOUNTER — NON-APPOINTMENT (OUTPATIENT)
Age: 58
End: 2023-09-25

## 2023-09-25 LAB
CYTOLOGY CVX/VAG DOC THIN PREP: ABNORMAL
HPV 16 E6+E7 MRNA CVX QL NAA+PROBE: NOT DETECTED
HPV HIGH+LOW RISK DNA PNL CVX: NOT DETECTED
HPV18+45 E6+E7 MRNA CVX QL NAA+PROBE: NOT DETECTED

## 2023-09-28 ENCOUNTER — APPOINTMENT (OUTPATIENT)
Dept: ENDOCRINOLOGY | Facility: CLINIC | Age: 58
End: 2023-09-28
Payer: COMMERCIAL

## 2023-09-28 VITALS
DIASTOLIC BLOOD PRESSURE: 72 MMHG | WEIGHT: 129 LBS | BODY MASS INDEX: 25.32 KG/M2 | SYSTOLIC BLOOD PRESSURE: 110 MMHG | HEART RATE: 96 BPM | OXYGEN SATURATION: 98 % | HEIGHT: 60 IN

## 2023-09-28 PROCEDURE — 99215 OFFICE O/P EST HI 40 MIN: CPT | Mod: 25

## 2023-09-28 PROCEDURE — 99491 CHRNC CARE MGMT PHYS 1ST 30: CPT

## 2023-12-18 ENCOUNTER — NON-APPOINTMENT (OUTPATIENT)
Age: 58
End: 2023-12-18

## 2024-01-04 ENCOUNTER — APPOINTMENT (OUTPATIENT)
Dept: ENDOCRINOLOGY | Facility: CLINIC | Age: 59
End: 2024-01-04
Payer: COMMERCIAL

## 2024-01-04 VITALS
BODY MASS INDEX: 25.32 KG/M2 | WEIGHT: 129 LBS | OXYGEN SATURATION: 99 % | DIASTOLIC BLOOD PRESSURE: 80 MMHG | HEIGHT: 60 IN | SYSTOLIC BLOOD PRESSURE: 112 MMHG | HEART RATE: 84 BPM

## 2024-01-04 DIAGNOSIS — E55.9 VITAMIN D DEFICIENCY, UNSPECIFIED: ICD-10-CM

## 2024-01-04 DIAGNOSIS — E66.3 OVERWEIGHT: ICD-10-CM

## 2024-01-04 PROCEDURE — 99215 OFFICE O/P EST HI 40 MIN: CPT

## 2024-01-04 NOTE — HISTORY OF PRESENT ILLNESS
[Home] : at home, [unfilled] , at the time of the visit. [Medical Office: ([facility])___] : at the medical office located in  [Verbal consent obtained from patient] : the patient, [unfilled] [FreeTextEntry1] :  59 yo WW coming for f/u papillary thyroid cancer last seen 10/2022  Seen hematology oncology for anemia CAT scan was done to figure out the reason for her anemia was told fatty liver 2/18 As compared to the patient's baseline study dated 10/13/2015, there has been no statistically  significant change in bone density at the lumbar spine or the left hip with a statistically  significant interval decrease in bone density noted at the left forearm.   stopped Vascepta, Cranberry , Calcium         6/1/18 neck US normal done with Dr Navarro at [place]        had total thyroidectomy 6/2009 done by Dr Laine Sethi [facility], retired now        labs reviewed        her deramthologist felt the thyroid nodule in 2008, an Endo in [place] did FNA without US came back negative, then repeated US guided FNA at [facility] showed papillary thyroid cancer one year later        did not need BATRES        her sister had also papillary thyroid cancer         last labwork reviewed         TSH 0.09 mIU/L FT4 1.3 (0.8-1.8) same dose Synthroid WILLIE 112mcg qd decreased from 125mcg qd 9/18         Hg 10.5 (11.7 and above) TG 0.3, TG ab <1, Vit D 29        labs 2010 TSH 0.20, FT4 0.92        thyroid uptake and scan 2008 showed 1.4cm left aspect of the isthmus cold area, possible thyroid malignancy        3/2008 1.4cm slightly lobulatedhetherogenously hypoechoic solid noduleon the left of the isthmus        neck US 5/27/15 9.7mm hyperechoic structure to the right is not thought to be thyroid parenchima, althout that possibility is not entirely excluded        7/29/2015 74.8mCi I131        postradiation WBS done 8/5/2015 2 small focal areas radioaiodine concentration in the midline anterior neck suspicious for small metastases         thyroidectomy 7/6/2009 papillary thyroid carcinoma, classical type, well differentiatled, with psammoma bodies, left lobe, greatest diameter 1.3cm, completelly surrounded, focal capsular invasion, focal blood vessel invasion, extrathyroid extension not identified, surgical margins + tumor , microscopic foci are present in the left lobe and the isthmus, one benign lymph node, fragment of prathyroid         labs reviewed         10/2015 DEXA scan normal spine and hip, mildly osteopenia forearm        iPTH elevated at 90, normal Ca normal Vit D, repeated iPTH 114 on 5/16        denies personal h/o kidney stones        denies h/o fractures        thyrogen stimulated WBS 6/6/2016 normal        thyrogen stim thyroglobulin level 6/6/2016 at [facility] Tg <0.2 ng/ML, atg <20        thyrogen stimulated whole body scan negative 6/14/17        thyroglobulin level 6/19/17 0.7 negative ab.  1/4/24 follow up-  Thyroid levels have been good on levothyroxine 100 mcg daily, 150 mcg on sundays.  She has not been taking calcium for nearly 2 years, but still has a good calcium level.  She does take vitamin D 2,000 IU daily with the level being low normal. Will increase to 4,000 IU daily.  Taking rosuvastatin and Vascepta. Working well along with dietary changes.

## 2024-02-27 ENCOUNTER — RX RENEWAL (OUTPATIENT)
Age: 59
End: 2024-02-27

## 2024-02-27 RX ORDER — METFORMIN ER 500 MG 500 MG/1
500 TABLET ORAL
Qty: 360 | Refills: 3 | Status: ACTIVE | COMMUNITY
Start: 2020-10-01 | End: 1900-01-01

## 2024-03-06 RX ORDER — FENOFIBRATE 145 MG/1
145 TABLET, COATED ORAL
Qty: 90 | Refills: 3 | Status: ACTIVE | COMMUNITY
Start: 2021-07-20 | End: 1900-01-01

## 2024-03-12 ENCOUNTER — RX RENEWAL (OUTPATIENT)
Age: 59
End: 2024-03-12

## 2024-03-12 RX ORDER — ROSUVASTATIN CALCIUM 20 MG/1
20 TABLET, FILM COATED ORAL
Qty: 90 | Refills: 1 | Status: ACTIVE | COMMUNITY
Start: 2024-03-12 | End: 1900-01-01

## 2024-03-26 NOTE — REASON FOR VISIT
[Follow - Up] : a follow-up visit [Thyroid Cancer] : thyroid cancer Patient is critically ill, requiring critical care services. Patient is critically ill, requiring critical care services. Patient is critically ill, requiring critical care services. Patient is critically ill, requiring critical care services. Patient is critically ill, requiring critical care services. Patient is critically ill, requiring critical care services. Patient is critically ill, requiring critical care services. Patient is critically ill, requiring critical care services. Patient is critically ill, requiring critical care services. Patient is critically ill, requiring critical care services.

## 2024-04-11 ENCOUNTER — RESULT REVIEW (OUTPATIENT)
Age: 59
End: 2024-04-11

## 2024-04-25 ENCOUNTER — APPOINTMENT (OUTPATIENT)
Dept: ENDOCRINOLOGY | Facility: CLINIC | Age: 59
End: 2024-04-25
Payer: COMMERCIAL

## 2024-04-25 DIAGNOSIS — E21.3 HYPERPARATHYROIDISM, UNSPECIFIED: ICD-10-CM

## 2024-04-25 DIAGNOSIS — R73.03 PREDIABETES.: ICD-10-CM

## 2024-04-25 DIAGNOSIS — C73 MALIGNANT NEOPLASM OF THYROID GLAND: ICD-10-CM

## 2024-04-25 DIAGNOSIS — E78.1 PURE HYPERGLYCERIDEMIA: ICD-10-CM

## 2024-04-25 DIAGNOSIS — K76.0 FATTY (CHANGE OF) LIVER, NOT ELSEWHERE CLASSIFIED: ICD-10-CM

## 2024-04-25 DIAGNOSIS — M85.80 OTHER SPECIFIED DISORDERS OF BONE DENSITY AND STRUCTURE, UNSPECIFIED SITE: ICD-10-CM

## 2024-04-25 PROCEDURE — 99215 OFFICE O/P EST HI 40 MIN: CPT

## 2024-04-25 RX ORDER — LEVOTHYROXINE SODIUM 0.1 MG/1
100 TABLET ORAL
Qty: 110 | Refills: 3 | Status: ACTIVE | COMMUNITY
Start: 2018-12-11 | End: 1900-01-01

## 2024-04-25 RX ORDER — ICOSAPENT ETHYL 1000 MG/1
1 CAPSULE ORAL
Qty: 360 | Refills: 3 | Status: ACTIVE | COMMUNITY
Start: 1900-01-01 | End: 1900-01-01

## 2024-04-25 NOTE — HISTORY OF PRESENT ILLNESS
[Home] : at home, [unfilled] , at the time of the visit. [Medical Office: ([facility])___] : at the medical office located in  [Verbal consent obtained from patient] : the patient, [unfilled] [FreeTextEntry1] :  57 yo WW coming for f/u papillary thyroid cancer last seen 10/2022  Seen hematology oncology for anemia CAT scan was done to figure out the reason for her anemia was told fatty liver 2/18 As compared to the patient's baseline study dated 10/13/2015, there has been no statistically  significant change in bone density at the lumbar spine or the left hip with a statistically  significant interval decrease in bone density noted at the left forearm.   stopped Vascepta, Cranberry , Calcium         6/1/18 neck US normal done with Dr Navarro at [place]        had total thyroidectomy 6/2009 done by Dr Laine Sethi [facility], retired now        labs reviewed        her deramthologist felt the thyroid nodule in 2008, an Endo in [place] did FNA without US came back negative, then repeated US guided FNA at [facility] showed papillary thyroid cancer one year later        did not need BATRES        her sister had also papillary thyroid cancer         last labwork reviewed         TSH 0.09 mIU/L FT4 1.3 (0.8-1.8) same dose Synthroid WILLIE 112mcg qd decreased from 125mcg qd 9/18         Hg 10.5 (11.7 and above) TG 0.3, TG ab <1, Vit D 29        labs 2010 TSH 0.20, FT4 0.92        thyroid uptake and scan 2008 showed 1.4cm left aspect of the isthmus cold area, possible thyroid malignancy        3/2008 1.4cm slightly lobulatedhetherogenously hypoechoic solid noduleon the left of the isthmus        neck US 5/27/15 9.7mm hyperechoic structure to the right is not thought to be thyroid parenchima, althout that possibility is not entirely excluded        7/29/2015 74.8mCi I131        postradiation WBS done 8/5/2015 2 small focal areas radioaiodine concentration in the midline anterior neck suspicious for small metastases         thyroidectomy 7/6/2009 papillary thyroid carcinoma, classical type, well differentiatled, with psammoma bodies, left lobe, greatest diameter 1.3cm, completelly surrounded, focal capsular invasion, focal blood vessel invasion, extrathyroid extension not identified, surgical margins + tumor , microscopic foci are present in the left lobe and the isthmus, one benign lymph node, fragment of prathyroid         labs reviewed         10/2015 DEXA scan normal spine and hip, mildly osteopenia forearm        iPTH elevated at 90, normal Ca normal Vit D, repeated iPTH 114 on 5/16        denies personal h/o kidney stones        denies h/o fractures        thyrogen stimulated WBS 6/6/2016 normal        thyrogen stim thyroglobulin level 6/6/2016 at [place] Tg <0.2 ng/ML, atg <20        thyrogen stimulated whole body scan negative 6/14/17        thyroglobulin level 6/19/17 0.7 negative ab.  1/4/24 follow up-  Thyroid levels have been good on levothyroxine 100 mcg daily, 150 mcg on sundays.  She has not been taking calcium for nearly 2 years, but still has a good calcium level.  She does take vitamin D 2,000 IU daily with the level being low normal. Will increase to 4,000 IU daily.  Taking rosuvastatin and Vascepta. Working well along with dietary changes.

## 2024-05-03 ENCOUNTER — APPOINTMENT (OUTPATIENT)
Dept: GYNECOLOGIC ONCOLOGY | Facility: CLINIC | Age: 59
End: 2024-05-03
Payer: COMMERCIAL

## 2024-05-03 VITALS — OXYGEN SATURATION: 98 % | HEART RATE: 88 BPM | DIASTOLIC BLOOD PRESSURE: 72 MMHG | SYSTOLIC BLOOD PRESSURE: 114 MMHG

## 2024-05-03 DIAGNOSIS — R92.30 DENSE BREASTS, UNSPECIFIED: ICD-10-CM

## 2024-05-03 DIAGNOSIS — N83.201 UNSPECIFIED OVARIAN CYST, RIGHT SIDE: ICD-10-CM

## 2024-05-03 DIAGNOSIS — Z00.00 ENCOUNTER FOR GENERAL ADULT MEDICAL EXAMINATION W/OUT ABNORMAL FINDINGS: ICD-10-CM

## 2024-05-03 PROCEDURE — 99459 PELVIC EXAMINATION: CPT

## 2024-05-03 PROCEDURE — 99213 OFFICE O/P EST LOW 20 MIN: CPT

## 2024-05-03 NOTE — DISCUSSION/SUMMARY
[Reviewed Clinical Lab Test(s)] : Results of clinical tests were reviewed. [Reviewed Radiology Report(s)] : Radiology reports were reviewed. [Reviewed Radiology Film/Image(s)] : Images from radiology studies were reviewed and examined. [Discuss Alternatives/Risks/Benefits w/Patient] : All alternatives, risks, and benefits were discussed with the patient/family and all questions were answered.  Patient expressed good understanding and appreciates the importance of follow up as recommended. [Visit Time ___ Minutes] : [unfilled] minutes [Face to Face Time___ Minutes] : with [unfilled] minutes in face to face consultation. [FreeTextEntry1] : 59yo P2 w/ persistent right ovarian cyst. MRI results reviewed and cyst is stable, patient continues to be asxs and continues to desire surveillance. -more than 50% of visit spent face to face with patient counseling and coordinating care -reviewed persistent ovarian cyst and slight change in size based on sono and reviewed MRI read which is low suspicion for cancer process. again reviewed that only surgical intervention with removal will definitively diagnose this cystic mass as benign vs precancer vs cancer.  But I did agree that continued surveillance is reasonable given overall low level of suspicion based on imaging. we agreed to space out MRIs to yearly given long term stability of the cyst. -pelvic MRI in 1yr order placed -pap today, f/u results  -rx mammo/ sono sent, will f/u results -rtc 1yr for annual visit and imaging review -pain/fever/bleeding precautions given

## 2024-05-03 NOTE — DISCUSSION/SUMMARY
[Reviewed Clinical Lab Test(s)] : Results of clinical tests were reviewed. [Reviewed Radiology Report(s)] : Radiology reports were reviewed. [Reviewed Radiology Film/Image(s)] : Images from radiology studies were reviewed and examined. [Discuss Alternatives/Risks/Benefits w/Patient] : All alternatives, risks, and benefits were discussed with the patient/family and all questions were answered.  Patient expressed good understanding and appreciates the importance of follow up as recommended. [Visit Time ___ Minutes] : [unfilled] minutes [Face to Face Time___ Minutes] : with [unfilled] minutes in face to face consultation. [FreeTextEntry1] : 57yo P2 w/ persistent right ovarian cyst. MRI results reviewed and cyst is stable, patient continues to be asxs and continues to desire surveillance. -more than 50% of visit spent face to face with patient counseling and coordinating care -reviewed persistent ovarian cyst and slight change in size based on sono and reviewed MRI read which is low suspicion for cancer process. again reviewed that only surgical intervention with removal will definitively diagnose this cystic mass as benign vs precancer vs cancer.  But I did agree that continued surveillance is reasonable given overall low level of suspicion based on imaging. we agreed to space out MRIs to yearly given long term stability of the cyst. -pelvic MRI in 1yr order placed -pap today, f/u results  -rx mammo/ sono sent, will f/u results -rtc 1yr for annual visit and imaging review -pain/fever/bleeding precautions given

## 2024-05-03 NOTE — HISTORY OF PRESENT ILLNESS
[FreeTextEntry1] : 57yo P2 w/ persistent right ovarian cyst  Since last visit patient reports doing well. She denies pain/fever/bleeding/bloating. She has normal activity tolerance and normal appetite. Reports normal urination and BMs. is up to date with her PCP. requests rx for mammo/sono. recent MRI reviewed  Pap 8/2022: NILM/HPV neg 9/2023: NILM/ HPV neg  MRI 3/27/24: FINDINGS:   The uterus is retroverted measuring 2.4 x 4.2 x 4.3cm (AP x transverse x craniocaudal dimension). The endometrial stripe measures 2mm. The endometrial cavity is unremarkable.  The junctional zone demonstrates normal thickness.  A few small uterine fibroids.  Multiseptated right adnexal mass measures 2.3 x 2.4 x 3.7cm, stable in size. No suspicious solid component or enhancement after contrast. The left ovary measures 1.1 x 1.7cm and is unremarkable.  The bladder is unremarkable.  There is no pelvic lymphadenopathy.  No pelvic free fluid is visualized.  The osseous structures are intact. IMPRESSION: Stable right ovarian multiseptated mass without suspicious features possibly serous or mucinous cystadenoma. Follow up MRI in 6 months if not resected.  IMAGING Hx: MRI 9/12/23: Anteverted uterus measures 5.2 x 3 x 4.3 cm, multiple myometrial fibroids, size range 0.6 cm 2.7 cm, noted to be enhancing.  There appears to be previous Zoom surgical material anteriorly to the lower uterine segment, endometrium measures 0.2 cm.  Right ovary measures 4.1 x 1.9 x 2.2 cm, there is a multilocular complex cystic lesion within the right ovary which measures 3.5 x 1.8 x 2.2 cm, thin septations, there may be a small amount of debris or hemorrhagic content present, no definite solid or enhancing component.  No restricted diffusion.  Most compatible with an O RADS 3 lesion/low risk.  Left ovary is well visualized, possibly due to small size, grossly measuring 1.5 x 8.9 cm.  No adenopathy, no ascites, bladder unremarkable, no destructive osseous processes.  Recommend imaging follow-up in 6 months if no intervention planned.  8/14/23: FINDINGS: Uterus is anteverted measuring 6 x 3 x 4.3 cm and is heterogeneous with multiple small fibroids including anterior fundal to the right measuring 9.3 x 9.1 x 9.1 mm and posterior fundal slightly to the left measuring 11 x 11 x 10 mm. The endometrial echo measures 1.6 mm.  The left ovary measures 1.6 x 1.4 x 1 cm.  The right ovary contains a large septated cyst overall pattern similar to the prior ultrasound. The ovary now measures 3.9 x 3.5 x 2.7 cm. The cystic component now measures 3.2 x 3.1 x 2.5 cm and previously measured 2.9 x 2.8 x 1.7 cm. IMPRESSION: Slight progression in size of complex right ovarian cystic lesion. MRI examination of the pelvis with and without contrast is again suggested if not previously performed to further evaluate this abnormality and to assess for a cystic neoplasm. Clinical evaluation is advised as well.  Pelvic sono 8/12/22: FINDINGS:  UTERUS: Uterus: Retroverted. The uterus measures 5.2 x 2.6 x 3.4 cm. Right-sided and posterior myometrial fibroids, measuring up to 1.0 x 0.8 x 1.0 cm. The endometrial stripe measures 1.7 mm in AP diameter which is within normal limits for a postmenopausal patient. Trace amount of fluid in the endometrial canal.  RIGHT OVARY: The right ovary is measured 3.2 x 2.0 x 2.6 cm. The right ovary is predominantly composed of a complex cystic lesion with irregular internal septations. On the prior CT, a 2 cm cyst was seen in the right ovary. Arterial flow is noted in the right ovary.  LEFT OVARY: The left ovary is not visualized due to overlying bowel gas.  FREE FLUID: No free fluid is seen in the cul de sac.  IMPRESSION:  The right ovary is measured 3.2 x 2.0 x 2.6, increased since the prior CT. The right ovary is predominantly composed of a complex cystic lesion with irregular internal septations. Given the irregularity of the septations and interval increased size, recommend pelvic MRI with and without intravenous contrast in order to exclude a cystic ovarian neoplasm.  Trace amount of fluid in the endocervical canal. No endometrial thickening.  Left ovary not visualized due to overlying bowel gas.

## 2024-05-03 NOTE — HISTORY OF PRESENT ILLNESS
[FreeTextEntry1] : 59yo P2 w/ persistent right ovarian cyst  Since last visit patient reports doing well. She denies pain/fever/bleeding/bloating. She has normal activity tolerance and normal appetite. Reports normal urination and BMs. is up to date with her PCP. requests rx for mammo/sono. recent MRI reviewed  Pap 8/2022: NILM/HPV neg 9/2023: NILM/ HPV neg  MRI 3/27/24: FINDINGS:   The uterus is retroverted measuring 2.4 x 4.2 x 4.3cm (AP x transverse x craniocaudal dimension). The endometrial stripe measures 2mm. The endometrial cavity is unremarkable.  The junctional zone demonstrates normal thickness.  A few small uterine fibroids.  Multiseptated right adnexal mass measures 2.3 x 2.4 x 3.7cm, stable in size. No suspicious solid component or enhancement after contrast. The left ovary measures 1.1 x 1.7cm and is unremarkable.  The bladder is unremarkable.  There is no pelvic lymphadenopathy.  No pelvic free fluid is visualized.  The osseous structures are intact. IMPRESSION: Stable right ovarian multiseptated mass without suspicious features possibly serous or mucinous cystadenoma. Follow up MRI in 6 months if not resected.  IMAGING Hx: MRI 9/12/23: Anteverted uterus measures 5.2 x 3 x 4.3 cm, multiple myometrial fibroids, size range 0.6 cm 2.7 cm, noted to be enhancing.  There appears to be previous Zoom surgical material anteriorly to the lower uterine segment, endometrium measures 0.2 cm.  Right ovary measures 4.1 x 1.9 x 2.2 cm, there is a multilocular complex cystic lesion within the right ovary which measures 3.5 x 1.8 x 2.2 cm, thin septations, there may be a small amount of debris or hemorrhagic content present, no definite solid or enhancing component.  No restricted diffusion.  Most compatible with an O RADS 3 lesion/low risk.  Left ovary is well visualized, possibly due to small size, grossly measuring 1.5 x 8.9 cm.  No adenopathy, no ascites, bladder unremarkable, no destructive osseous processes.  Recommend imaging follow-up in 6 months if no intervention planned.  8/14/23: FINDINGS: Uterus is anteverted measuring 6 x 3 x 4.3 cm and is heterogeneous with multiple small fibroids including anterior fundal to the right measuring 9.3 x 9.1 x 9.1 mm and posterior fundal slightly to the left measuring 11 x 11 x 10 mm. The endometrial echo measures 1.6 mm.  The left ovary measures 1.6 x 1.4 x 1 cm.  The right ovary contains a large septated cyst overall pattern similar to the prior ultrasound. The ovary now measures 3.9 x 3.5 x 2.7 cm. The cystic component now measures 3.2 x 3.1 x 2.5 cm and previously measured 2.9 x 2.8 x 1.7 cm. IMPRESSION: Slight progression in size of complex right ovarian cystic lesion. MRI examination of the pelvis with and without contrast is again suggested if not previously performed to further evaluate this abnormality and to assess for a cystic neoplasm. Clinical evaluation is advised as well.  Pelvic sono 8/12/22: FINDINGS:  UTERUS: Uterus: Retroverted. The uterus measures 5.2 x 2.6 x 3.4 cm. Right-sided and posterior myometrial fibroids, measuring up to 1.0 x 0.8 x 1.0 cm. The endometrial stripe measures 1.7 mm in AP diameter which is within normal limits for a postmenopausal patient. Trace amount of fluid in the endometrial canal.  RIGHT OVARY: The right ovary is measured 3.2 x 2.0 x 2.6 cm. The right ovary is predominantly composed of a complex cystic lesion with irregular internal septations. On the prior CT, a 2 cm cyst was seen in the right ovary. Arterial flow is noted in the right ovary.  LEFT OVARY: The left ovary is not visualized due to overlying bowel gas.  FREE FLUID: No free fluid is seen in the cul de sac.  IMPRESSION:  The right ovary is measured 3.2 x 2.0 x 2.6, increased since the prior CT. The right ovary is predominantly composed of a complex cystic lesion with irregular internal septations. Given the irregularity of the septations and interval increased size, recommend pelvic MRI with and without intravenous contrast in order to exclude a cystic ovarian neoplasm.  Trace amount of fluid in the endocervical canal. No endometrial thickening.  Left ovary not visualized due to overlying bowel gas.

## 2024-05-08 LAB — CYTOLOGY CVX/VAG DOC THIN PREP: ABNORMAL

## 2024-05-13 ENCOUNTER — NON-APPOINTMENT (OUTPATIENT)
Age: 59
End: 2024-05-13

## 2024-06-20 ENCOUNTER — APPOINTMENT (OUTPATIENT)
Dept: GASTROENTEROLOGY | Facility: CLINIC | Age: 59
End: 2024-06-20
Payer: COMMERCIAL

## 2024-06-20 VITALS
HEIGHT: 60 IN | OXYGEN SATURATION: 100 % | SYSTOLIC BLOOD PRESSURE: 122 MMHG | RESPIRATION RATE: 16 BRPM | DIASTOLIC BLOOD PRESSURE: 78 MMHG | WEIGHT: 125 LBS | HEART RATE: 87 BPM | BODY MASS INDEX: 24.54 KG/M2

## 2024-06-20 DIAGNOSIS — R19.7 DIARRHEA, UNSPECIFIED: ICD-10-CM

## 2024-06-20 DIAGNOSIS — K21.9 GASTRO-ESOPHAGEAL REFLUX DISEASE W/OUT ESOPHAGITIS: ICD-10-CM

## 2024-06-20 PROCEDURE — G2211 COMPLEX E/M VISIT ADD ON: CPT | Mod: NC

## 2024-06-20 PROCEDURE — 99214 OFFICE O/P EST MOD 30 MIN: CPT

## 2024-06-20 NOTE — HISTORY OF PRESENT ILLNESS
[FreeTextEntry1] : Ms. Diaz is a pleasant 58F h/o papillary thyroid cancer age 43 s/p resection, recurrence s/p treatment, hyperparathyroidism, osteopenia, pre-DM who returns for f/u. Previously seen for anemia, underwent a capsule study with myself 6/22, unremarkable. Prior EGD 3/10/22, "normal." Prior colonoscopy 2022, "normal," both with Dr. Baird.  Returns today with diarrhea. Had COVID for the first time 9/23, shortly thereafter followed by a sinus infection. She was given amoxillin followed by another antibiotic (possibly cefdinir?), resulting in brown watery diarrhea. Eventually improved. Again with a sinus infection this May, again given amoxicillin, has since had profuse diarrhea. Stopper her amoxicillin approximately 10 days ago, has continued to have loose watery brown stool, up to 10x/day. No blood, mucus, oil, black stool. Has lost around 5lbs.  Has been eating a bland diet. No further testing. Tried probiotics, thinks they worsened symptoms, stopped.  Has been taking pantoprazole for many years for acid reflux, has not tried stopping. Was recently placed on pepcid within the past week or two for unclear reasons. Does not smoke or drink. No FHx of any GI malignancies.

## 2024-06-20 NOTE — ASSESSMENT
[FreeTextEntry1] : Check stool studies as listed. Will stop her PPI (increased risk of diarrhea on a PPI). Will increase her pepcid to 20mg twice daily. Will use Gaviscon PRN. If stool studies are negative, will start imodium.

## 2024-06-25 LAB
CDIFF BY PCR: NOT DETECTED
DEPRECATED O AND P PREP STL: NORMAL
GI PCR PANEL: NOT DETECTED

## 2024-07-09 ENCOUNTER — APPOINTMENT (OUTPATIENT)
Dept: ENDOCRINOLOGY | Facility: CLINIC | Age: 59
End: 2024-07-09

## 2024-09-08 ENCOUNTER — NON-APPOINTMENT (OUTPATIENT)
Age: 59
End: 2024-09-08

## 2024-09-09 ENCOUNTER — APPOINTMENT (OUTPATIENT)
Dept: ENDOCRINOLOGY | Facility: CLINIC | Age: 59
End: 2024-09-09
Payer: COMMERCIAL

## 2024-09-09 VITALS
BODY MASS INDEX: 24.54 KG/M2 | WEIGHT: 125 LBS | OXYGEN SATURATION: 99 % | SYSTOLIC BLOOD PRESSURE: 110 MMHG | HEIGHT: 60 IN | HEART RATE: 90 BPM | DIASTOLIC BLOOD PRESSURE: 70 MMHG

## 2024-09-09 DIAGNOSIS — R73.03 PREDIABETES.: ICD-10-CM

## 2024-09-09 DIAGNOSIS — C73 MALIGNANT NEOPLASM OF THYROID GLAND: ICD-10-CM

## 2024-09-09 DIAGNOSIS — E55.9 VITAMIN D DEFICIENCY, UNSPECIFIED: ICD-10-CM

## 2024-09-09 DIAGNOSIS — M85.80 OTHER SPECIFIED DISORDERS OF BONE DENSITY AND STRUCTURE, UNSPECIFIED SITE: ICD-10-CM

## 2024-09-09 DIAGNOSIS — E78.1 PURE HYPERGLYCERIDEMIA: ICD-10-CM

## 2024-09-09 DIAGNOSIS — K76.0 FATTY (CHANGE OF) LIVER, NOT ELSEWHERE CLASSIFIED: ICD-10-CM

## 2024-09-09 DIAGNOSIS — E66.3 OVERWEIGHT: ICD-10-CM

## 2024-09-09 DIAGNOSIS — D50.9 IRON DEFICIENCY ANEMIA, UNSPECIFIED: ICD-10-CM

## 2024-09-09 DIAGNOSIS — E21.3 HYPERPARATHYROIDISM, UNSPECIFIED: ICD-10-CM

## 2024-09-09 PROCEDURE — G2211 COMPLEX E/M VISIT ADD ON: CPT | Mod: NC

## 2024-09-09 PROCEDURE — 99215 OFFICE O/P EST HI 40 MIN: CPT

## 2024-09-09 RX ORDER — CALCIUM CARBONATE 500(1250)
500 TABLET ORAL
Refills: 0 | Status: ACTIVE | COMMUNITY

## 2024-09-09 RX ORDER — OMEGA-3-ACID ETHYL ESTERS CAPSULES 1 G/1
1 CAPSULE, LIQUID FILLED ORAL
Qty: 360 | Refills: 2 | Status: ACTIVE | COMMUNITY
Start: 2024-09-09 | End: 1900-01-01

## 2024-09-09 RX ORDER — LEVOTHYROXINE SODIUM 0.11 MG/1
112 TABLET ORAL
Qty: 90 | Refills: 1 | Status: ACTIVE | COMMUNITY
Start: 2024-09-09 | End: 1900-01-01

## 2024-09-09 NOTE — HISTORY OF PRESENT ILLNESS
[Home] : at home, [unfilled] , at the time of the visit. [Medical Office: ([facility])___] : at the medical office located in  [Verbal consent obtained from patient] : the patient, [unfilled] [FreeTextEntry1] :  59 yo WW coming for f/u papillary thyroid cancer last seen 10/2022  Seen hematology oncology for anemia CAT scan was done to figure out the reason for her anemia was told fatty liver  As compared to the patient's baseline study dated 10/13/2015, there has been no statistically  significant change in bone density at the lumbar spine or the left hip with a statistically  significant interval decrease in bone density noted at the left forearm.   stopped Vascepta, Cranberry , Calcium         17 neck US normal done with Dr Navarro at [facility]        had total thyroidectomy 2009 done by Dr Laine Sethi [facility], retired now        labs reviewed        her deramthologist felt the thyroid nodule in , an Endo in [place] did FNA without US came back negative, then repeated US guided FNA at [facility] showed papillary thyroid cancer one year later        did not need BATRES        her sister had also papillary thyroid cancer         last labwork reviewed         TSH 0.09 mIU/L FT4 1.3 (0.8-1.8) same dose Synthroid WILLIE 112mcg qd decreased from 125mcg qd          Hg 10.5 (11.7 and above) TG 0.3, TG ab <1, Vit D 29        labs  TSH 0.20, FT4 0.92        thyroid uptake and scan  showed 1.4cm left aspect of the isthmus cold area, possible thyroid malignancy        3/2008 1.4cm slightly lobulatedhetherogenously hypoechoic solid noduleon the left of the isthmus        neck US 5/27/15 9.7mm hyperechoic structure to the right is not thought to be thyroid parenchima, althout that possibility is not entirely excluded        2015 74.8mCi I131        postradiation WBS done 2015 2 small focal areas radioaiodine concentration in the midline anterior neck suspicious for small metastases         thyroidectomy 2009 papillary thyroid carcinoma, classical type, well differentiatled, with psammoma bodies, left lobe, greatest diameter 1.3cm, completelly surrounded, focal capsular invasion, focal blood vessel invasion, extrathyroid extension not identified, surgical margins + tumor , microscopic foci are present in the left lobe and the isthmus, one benign lymph node, fragment of prathyroid         labs reviewed         10/2015 DEXA scan normal spine and hip, mildly osteopenia forearm        iPTH elevated at 90, normal Ca normal Vit D, repeated iPTH 114 on         denies personal h/o kidney stones        denies h/o fractures        thyrogen stimulated WBS 2016 normal        thyrogen stim thyroglobulin level 2016 at [facility] Tg <0.2 ng/ML, atg <20        thyrogen stimulated whole body scan negative 17        thyroglobulin level 17 0.7 negative ab.  24 follow up-  Thyroid levels have been good on levothyroxine 100 mcg daily, 150 mcg on sundays.  She has not been taking calcium for nearly 2 years, but still has a good calcium level.  She does take vitamin D 2,000 IU daily with the level being low normal. Will increase to 4,000 IU daily.  Taking rosuvastatin and Vascepta. Working well along with dietary changes.   2024 her father  recently mother fell and broke her hip patient feeling good, concerned that her labs showed elevated TSH and Vascepta was denied by her insurance

## 2024-11-11 ENCOUNTER — NON-APPOINTMENT (OUTPATIENT)
Age: 59
End: 2024-11-11

## 2024-12-05 ENCOUNTER — APPOINTMENT (OUTPATIENT)
Dept: ENDOCRINOLOGY | Facility: CLINIC | Age: 59
End: 2024-12-05
Payer: COMMERCIAL

## 2024-12-05 VITALS
DIASTOLIC BLOOD PRESSURE: 52 MMHG | WEIGHT: 125 LBS | HEART RATE: 92 BPM | SYSTOLIC BLOOD PRESSURE: 106 MMHG | HEIGHT: 60 IN | BODY MASS INDEX: 24.54 KG/M2 | OXYGEN SATURATION: 98 %

## 2024-12-05 DIAGNOSIS — R19.7 DIARRHEA, UNSPECIFIED: ICD-10-CM

## 2024-12-05 DIAGNOSIS — E21.3 HYPERPARATHYROIDISM, UNSPECIFIED: ICD-10-CM

## 2024-12-05 DIAGNOSIS — E78.1 PURE HYPERGLYCERIDEMIA: ICD-10-CM

## 2024-12-05 DIAGNOSIS — M85.80 OTHER SPECIFIED DISORDERS OF BONE DENSITY AND STRUCTURE, UNSPECIFIED SITE: ICD-10-CM

## 2024-12-05 DIAGNOSIS — D50.9 IRON DEFICIENCY ANEMIA, UNSPECIFIED: ICD-10-CM

## 2024-12-05 DIAGNOSIS — E55.9 VITAMIN D DEFICIENCY, UNSPECIFIED: ICD-10-CM

## 2024-12-05 DIAGNOSIS — K76.0 FATTY (CHANGE OF) LIVER, NOT ELSEWHERE CLASSIFIED: ICD-10-CM

## 2024-12-05 DIAGNOSIS — C73 MALIGNANT NEOPLASM OF THYROID GLAND: ICD-10-CM

## 2024-12-05 DIAGNOSIS — R73.03 PREDIABETES.: ICD-10-CM

## 2024-12-05 DIAGNOSIS — E66.3 OVERWEIGHT: ICD-10-CM

## 2024-12-05 PROCEDURE — G2211 COMPLEX E/M VISIT ADD ON: CPT | Mod: NC

## 2024-12-05 PROCEDURE — 99215 OFFICE O/P EST HI 40 MIN: CPT

## 2024-12-18 ENCOUNTER — APPOINTMENT (OUTPATIENT)
Dept: CARDIOLOGY | Facility: CLINIC | Age: 59
End: 2024-12-18
Payer: COMMERCIAL

## 2024-12-18 ENCOUNTER — NON-APPOINTMENT (OUTPATIENT)
Age: 59
End: 2024-12-18

## 2024-12-18 VITALS
SYSTOLIC BLOOD PRESSURE: 122 MMHG | HEIGHT: 60 IN | DIASTOLIC BLOOD PRESSURE: 60 MMHG | OXYGEN SATURATION: 96 % | HEART RATE: 86 BPM

## 2024-12-18 DIAGNOSIS — Z71.89 OTHER SPECIFIED COUNSELING: ICD-10-CM

## 2024-12-18 DIAGNOSIS — E78.5 HYPERLIPIDEMIA, UNSPECIFIED: ICD-10-CM

## 2024-12-18 DIAGNOSIS — R01.1 CARDIAC MURMUR, UNSPECIFIED: ICD-10-CM

## 2024-12-18 DIAGNOSIS — R73.03 PREDIABETES.: ICD-10-CM

## 2024-12-18 PROCEDURE — 93000 ELECTROCARDIOGRAM COMPLETE: CPT

## 2024-12-18 PROCEDURE — 36415 COLL VENOUS BLD VENIPUNCTURE: CPT

## 2024-12-18 PROCEDURE — 99205 OFFICE O/P NEW HI 60 MIN: CPT

## 2024-12-20 LAB — CRP SERPL HS-MCNC: 1.51 MG/L

## 2025-01-14 ENCOUNTER — RESULT REVIEW (OUTPATIENT)
Age: 60
End: 2025-01-14

## 2025-01-14 ENCOUNTER — APPOINTMENT (OUTPATIENT)
Dept: HEMATOLOGY ONCOLOGY | Facility: CLINIC | Age: 60
End: 2025-01-14
Payer: COMMERCIAL

## 2025-01-14 VITALS
TEMPERATURE: 97.7 F | SYSTOLIC BLOOD PRESSURE: 130 MMHG | OXYGEN SATURATION: 99 % | RESPIRATION RATE: 15 BRPM | HEART RATE: 70 BPM | DIASTOLIC BLOOD PRESSURE: 79 MMHG | HEIGHT: 61 IN

## 2025-01-14 DIAGNOSIS — K21.9 GASTRO-ESOPHAGEAL REFLUX DISEASE W/OUT ESOPHAGITIS: ICD-10-CM

## 2025-01-14 DIAGNOSIS — D50.9 IRON DEFICIENCY ANEMIA, UNSPECIFIED: ICD-10-CM

## 2025-01-14 PROCEDURE — 99215 OFFICE O/P EST HI 40 MIN: CPT

## 2025-01-14 RX ORDER — IRON POLYSACCHARIDE COMPLEX 150 MG
150 CAPSULE ORAL
Qty: 30 | Refills: 5 | Status: ACTIVE | COMMUNITY
Start: 2025-01-14 | End: 1900-01-01

## 2025-01-16 RX ORDER — IRON SUCROSE 20 MG/ML
20 INJECTION, SOLUTION INTRAVENOUS
Qty: 5 | Refills: 0 | Status: ACTIVE | COMMUNITY
Start: 2025-01-14 | End: 1900-01-01

## 2025-01-22 ENCOUNTER — RESULT REVIEW (OUTPATIENT)
Age: 60
End: 2025-01-22

## 2025-01-30 ENCOUNTER — APPOINTMENT (OUTPATIENT)
Dept: HEPATOLOGY | Facility: CLINIC | Age: 60
End: 2025-01-30
Payer: COMMERCIAL

## 2025-01-30 VITALS
BODY MASS INDEX: 24.17 KG/M2 | SYSTOLIC BLOOD PRESSURE: 110 MMHG | WEIGHT: 128 LBS | OXYGEN SATURATION: 98 % | HEIGHT: 61 IN | HEART RATE: 83 BPM | DIASTOLIC BLOOD PRESSURE: 74 MMHG

## 2025-01-30 DIAGNOSIS — Z80.0 FAMILY HISTORY OF MALIGNANT NEOPLASM OF DIGESTIVE ORGANS: ICD-10-CM

## 2025-01-30 DIAGNOSIS — K76.0 FATTY (CHANGE OF) LIVER, NOT ELSEWHERE CLASSIFIED: ICD-10-CM

## 2025-01-30 DIAGNOSIS — E78.5 HYPERLIPIDEMIA, UNSPECIFIED: ICD-10-CM

## 2025-01-30 DIAGNOSIS — R74.8 ABNORMAL LEVELS OF OTHER SERUM ENZYMES: ICD-10-CM

## 2025-01-30 DIAGNOSIS — D50.9 IRON DEFICIENCY ANEMIA, UNSPECIFIED: ICD-10-CM

## 2025-01-30 DIAGNOSIS — R73.03 PREDIABETES.: ICD-10-CM

## 2025-01-30 PROCEDURE — G2211 COMPLEX E/M VISIT ADD ON: CPT | Mod: NC

## 2025-01-30 PROCEDURE — 99214 OFFICE O/P EST MOD 30 MIN: CPT

## 2025-01-30 RX ORDER — SERTRALINE 25 MG/1
TABLET, FILM COATED ORAL
Refills: 0 | Status: ACTIVE | COMMUNITY

## 2025-01-30 RX ORDER — ICOSAPENT ETHYL 500 MG/1
CAPSULE ORAL
Refills: 0 | Status: ACTIVE | COMMUNITY

## 2025-01-31 LAB
ALBUMIN SERPL ELPH-MCNC: 4.9 G/DL
ALP BLD-CCNC: 48 U/L
ALT SERPL-CCNC: 51 U/L
ANION GAP SERPL CALC-SCNC: 13 MMOL/L
AST SERPL-CCNC: 40 U/L
BASOPHILS # BLD AUTO: 0.08 K/UL
BASOPHILS NFR BLD AUTO: 0.9 %
BILIRUB SERPL-MCNC: 0.3 MG/DL
BUN SERPL-MCNC: 14 MG/DL
CALCIUM SERPL-MCNC: 10.2 MG/DL
CHLORIDE SERPL-SCNC: 100 MMOL/L
CO2 SERPL-SCNC: 27 MMOL/L
CREAT SERPL-MCNC: 0.88 MG/DL
EGFR: 75 ML/MIN/1.73M2
EOSINOPHIL # BLD AUTO: 0.17 K/UL
EOSINOPHIL NFR BLD AUTO: 1.9 %
GLUCOSE SERPL-MCNC: 96 MG/DL
HCT VFR BLD CALC: 40 %
HGB BLD-MCNC: 12.6 G/DL
IMM GRANULOCYTES NFR BLD AUTO: 0.3 %
LYMPHOCYTES # BLD AUTO: 3.3 K/UL
LYMPHOCYTES NFR BLD AUTO: 36.3 %
MAN DIFF?: NORMAL
MCHC RBC-ENTMCNC: 26.3 PG
MCHC RBC-ENTMCNC: 31.5 G/DL
MCV RBC AUTO: 83.3 FL
MONOCYTES # BLD AUTO: 0.61 K/UL
MONOCYTES NFR BLD AUTO: 6.7 %
NEUTROPHILS # BLD AUTO: 4.9 K/UL
NEUTROPHILS NFR BLD AUTO: 53.9 %
PLATELET # BLD AUTO: 368 K/UL
POTASSIUM SERPL-SCNC: 4.7 MMOL/L
PROT SERPL-MCNC: 7.4 G/DL
RBC # BLD: 4.8 M/UL
RBC # FLD: 14.7 %
SODIUM SERPL-SCNC: 140 MMOL/L
WBC # FLD AUTO: 9.09 K/UL

## 2025-02-04 ENCOUNTER — RX RENEWAL (OUTPATIENT)
Age: 60
End: 2025-02-04

## 2025-02-05 LAB
ANA SER IF-ACNC: NEGATIVE
HBV CORE IGG+IGM SER QL: NONREACTIVE
HBV SURFACE AB SER QL: NONREACTIVE
HBV SURFACE AG SER QL: NONREACTIVE
HCV AB SER QL: NONREACTIVE
HCV S/CO RATIO: 0.06 S/CO
HEPATITIS A IGG ANTIBODY: NONREACTIVE
SMOOTH MUSCLE AB SER QL IF: NORMAL

## 2025-03-06 ENCOUNTER — APPOINTMENT (OUTPATIENT)
Dept: HEPATOLOGY | Facility: CLINIC | Age: 60
End: 2025-03-06
Payer: COMMERCIAL

## 2025-03-06 VITALS
HEART RATE: 82 BPM | HEIGHT: 61 IN | DIASTOLIC BLOOD PRESSURE: 70 MMHG | WEIGHT: 128 LBS | OXYGEN SATURATION: 98 % | SYSTOLIC BLOOD PRESSURE: 112 MMHG | BODY MASS INDEX: 24.17 KG/M2

## 2025-03-06 DIAGNOSIS — R74.8 ABNORMAL LEVELS OF OTHER SERUM ENZYMES: ICD-10-CM

## 2025-03-06 DIAGNOSIS — K74.00 HEPATIC FIBROSIS, UNSPECIFIED: ICD-10-CM

## 2025-03-06 DIAGNOSIS — K75.81 NONALCOHOLIC STEATOHEPATITIS (NASH): ICD-10-CM

## 2025-03-06 PROCEDURE — 99214 OFFICE O/P EST MOD 30 MIN: CPT

## 2025-03-06 PROCEDURE — G2211 COMPLEX E/M VISIT ADD ON: CPT | Mod: NC

## 2025-03-11 ENCOUNTER — NON-APPOINTMENT (OUTPATIENT)
Age: 60
End: 2025-03-11

## 2025-03-11 ENCOUNTER — APPOINTMENT (OUTPATIENT)
Dept: ENDOCRINOLOGY | Facility: CLINIC | Age: 60
End: 2025-03-11

## 2025-03-17 ENCOUNTER — RESULT REVIEW (OUTPATIENT)
Age: 60
End: 2025-03-17

## 2025-03-17 ENCOUNTER — APPOINTMENT (OUTPATIENT)
Dept: HEMATOLOGY ONCOLOGY | Facility: CLINIC | Age: 60
End: 2025-03-17
Payer: COMMERCIAL

## 2025-03-17 VITALS
OXYGEN SATURATION: 99 % | HEIGHT: 61 IN | TEMPERATURE: 97.8 F | HEART RATE: 77 BPM | DIASTOLIC BLOOD PRESSURE: 75 MMHG | SYSTOLIC BLOOD PRESSURE: 130 MMHG | RESPIRATION RATE: 16 BRPM

## 2025-03-17 VITALS — BODY MASS INDEX: 23.62 KG/M2 | WEIGHT: 125 LBS

## 2025-03-17 DIAGNOSIS — K21.9 GASTRO-ESOPHAGEAL REFLUX DISEASE W/OUT ESOPHAGITIS: ICD-10-CM

## 2025-03-17 DIAGNOSIS — D50.9 IRON DEFICIENCY ANEMIA, UNSPECIFIED: ICD-10-CM

## 2025-03-17 PROCEDURE — 99214 OFFICE O/P EST MOD 30 MIN: CPT

## 2025-04-08 ENCOUNTER — APPOINTMENT (OUTPATIENT)
Dept: ENDOCRINOLOGY | Facility: CLINIC | Age: 60
End: 2025-04-08
Payer: COMMERCIAL

## 2025-04-08 VITALS
BODY MASS INDEX: 23.6 KG/M2 | DIASTOLIC BLOOD PRESSURE: 66 MMHG | SYSTOLIC BLOOD PRESSURE: 104 MMHG | OXYGEN SATURATION: 99 % | HEIGHT: 61 IN | WEIGHT: 125 LBS | HEART RATE: 96 BPM

## 2025-04-08 DIAGNOSIS — R74.8 ABNORMAL LEVELS OF OTHER SERUM ENZYMES: ICD-10-CM

## 2025-04-08 DIAGNOSIS — D50.9 IRON DEFICIENCY ANEMIA, UNSPECIFIED: ICD-10-CM

## 2025-04-08 DIAGNOSIS — M85.80 OTHER SPECIFIED DISORDERS OF BONE DENSITY AND STRUCTURE, UNSPECIFIED SITE: ICD-10-CM

## 2025-04-08 DIAGNOSIS — C73 MALIGNANT NEOPLASM OF THYROID GLAND: ICD-10-CM

## 2025-04-08 DIAGNOSIS — E55.9 VITAMIN D DEFICIENCY, UNSPECIFIED: ICD-10-CM

## 2025-04-08 DIAGNOSIS — R73.03 PREDIABETES.: ICD-10-CM

## 2025-04-08 DIAGNOSIS — E78.5 HYPERLIPIDEMIA, UNSPECIFIED: ICD-10-CM

## 2025-04-08 DIAGNOSIS — E21.3 HYPERPARATHYROIDISM, UNSPECIFIED: ICD-10-CM

## 2025-04-08 PROCEDURE — 99215 OFFICE O/P EST HI 40 MIN: CPT

## 2025-04-08 PROCEDURE — G2211 COMPLEX E/M VISIT ADD ON: CPT | Mod: NC

## 2025-04-17 ENCOUNTER — RESULT REVIEW (OUTPATIENT)
Age: 60
End: 2025-04-17

## 2025-05-02 ENCOUNTER — APPOINTMENT (OUTPATIENT)
Dept: GYNECOLOGIC ONCOLOGY | Facility: CLINIC | Age: 60
End: 2025-05-02
Payer: COMMERCIAL

## 2025-05-02 VITALS
WEIGHT: 125 LBS | OXYGEN SATURATION: 98 % | HEART RATE: 82 BPM | HEIGHT: 61 IN | SYSTOLIC BLOOD PRESSURE: 124 MMHG | BODY MASS INDEX: 23.6 KG/M2 | DIASTOLIC BLOOD PRESSURE: 83 MMHG

## 2025-05-02 DIAGNOSIS — Z00.00 ENCOUNTER FOR GENERAL ADULT MEDICAL EXAMINATION W/OUT ABNORMAL FINDINGS: ICD-10-CM

## 2025-05-02 DIAGNOSIS — N83.201 UNSPECIFIED OVARIAN CYST, RIGHT SIDE: ICD-10-CM

## 2025-05-02 PROCEDURE — 99214 OFFICE O/P EST MOD 30 MIN: CPT

## 2025-05-22 ENCOUNTER — APPOINTMENT (OUTPATIENT)
Dept: HEPATOLOGY | Facility: CLINIC | Age: 60
End: 2025-05-22

## 2025-06-09 ENCOUNTER — APPOINTMENT (OUTPATIENT)
Dept: ENDOCRINOLOGY | Facility: CLINIC | Age: 60
End: 2025-06-09

## 2025-06-17 ENCOUNTER — RESULT REVIEW (OUTPATIENT)
Age: 60
End: 2025-06-17

## 2025-06-17 ENCOUNTER — APPOINTMENT (OUTPATIENT)
Dept: HEMATOLOGY ONCOLOGY | Facility: CLINIC | Age: 60
End: 2025-06-17
Payer: COMMERCIAL

## 2025-06-17 VITALS
RESPIRATION RATE: 16 BRPM | SYSTOLIC BLOOD PRESSURE: 113 MMHG | HEIGHT: 61 IN | TEMPERATURE: 98 F | OXYGEN SATURATION: 97 % | HEART RATE: 89 BPM | DIASTOLIC BLOOD PRESSURE: 76 MMHG

## 2025-06-17 PROCEDURE — 99214 OFFICE O/P EST MOD 30 MIN: CPT

## 2025-07-15 ENCOUNTER — APPOINTMENT (OUTPATIENT)
Dept: ENDOCRINOLOGY | Facility: CLINIC | Age: 60
End: 2025-07-15
Payer: COMMERCIAL

## 2025-07-15 VITALS
BODY MASS INDEX: 23.6 KG/M2 | HEIGHT: 61 IN | OXYGEN SATURATION: 98 % | WEIGHT: 125 LBS | DIASTOLIC BLOOD PRESSURE: 58 MMHG | SYSTOLIC BLOOD PRESSURE: 96 MMHG | HEART RATE: 99 BPM

## 2025-07-15 PROBLEM — E78.1 HIGH TRIGLYCERIDES: Status: ACTIVE | Noted: 2025-07-15

## 2025-07-15 PROCEDURE — 99215 OFFICE O/P EST HI 40 MIN: CPT

## 2025-07-15 PROCEDURE — G2211 COMPLEX E/M VISIT ADD ON: CPT | Mod: NC

## 2025-07-15 RX ORDER — OMEGA-3-ACID ETHYL ESTERS 1 G/1
1 CAPSULE ORAL
Qty: 360 | Refills: 2 | Status: ACTIVE | COMMUNITY
Start: 2025-07-15 | End: 1900-01-01

## 2025-07-16 ENCOUNTER — RX RENEWAL (OUTPATIENT)
Age: 60
End: 2025-07-16

## 2025-09-08 ENCOUNTER — RESULT REVIEW (OUTPATIENT)
Age: 60
End: 2025-09-08

## 2025-09-09 ENCOUNTER — RX RENEWAL (OUTPATIENT)
Age: 60
End: 2025-09-09

## 2025-09-12 ENCOUNTER — RESULT REVIEW (OUTPATIENT)
Age: 60
End: 2025-09-12

## 2025-09-12 ENCOUNTER — APPOINTMENT (OUTPATIENT)
Dept: HEMATOLOGY ONCOLOGY | Facility: CLINIC | Age: 60
End: 2025-09-12

## 2025-09-12 VITALS
TEMPERATURE: 97.9 F | HEART RATE: 88 BPM | HEIGHT: 61 IN | WEIGHT: 125.13 LBS | OXYGEN SATURATION: 98 % | SYSTOLIC BLOOD PRESSURE: 108 MMHG | RESPIRATION RATE: 16 BRPM | BODY MASS INDEX: 23.63 KG/M2 | DIASTOLIC BLOOD PRESSURE: 71 MMHG

## 2025-09-12 DIAGNOSIS — D50.9 IRON DEFICIENCY ANEMIA, UNSPECIFIED: ICD-10-CM

## 2025-09-12 PROCEDURE — 99214 OFFICE O/P EST MOD 30 MIN: CPT
